# Patient Record
Sex: FEMALE | Race: WHITE | Employment: STUDENT | URBAN - NONMETROPOLITAN AREA
[De-identification: names, ages, dates, MRNs, and addresses within clinical notes are randomized per-mention and may not be internally consistent; named-entity substitution may affect disease eponyms.]

---

## 2017-12-03 ENCOUNTER — HOSPITAL ENCOUNTER (EMERGENCY)
Age: 22
Discharge: HOME | End: 2017-12-03
Payer: COMMERCIAL

## 2017-12-03 ENCOUNTER — APPOINTMENT (OUTPATIENT)
Dept: GENERAL RADIOLOGY | Age: 22
End: 2017-12-03
Payer: COMMERCIAL

## 2017-12-03 VITALS
TEMPERATURE: 98.5 F | HEART RATE: 65 BPM | DIASTOLIC BLOOD PRESSURE: 72 MMHG | OXYGEN SATURATION: 99 % | SYSTOLIC BLOOD PRESSURE: 117 MMHG | RESPIRATION RATE: 16 BRPM

## 2017-12-03 DIAGNOSIS — R42 LIGHTHEADEDNESS: Primary | ICD-10-CM

## 2017-12-03 LAB
-: ABNORMAL
ABSOLUTE EOS #: 0.2 K/UL (ref 0–0.4)
ABSOLUTE IMMATURE GRANULOCYTE: NORMAL K/UL (ref 0–0.3)
ABSOLUTE LYMPH #: 3.1 K/UL (ref 1–4.8)
ABSOLUTE MONO #: 0.9 K/UL (ref 0–1)
AMORPHOUS: ABNORMAL
ANION GAP SERPL CALCULATED.3IONS-SCNC: 16 MMOL/L
BACTERIA: ABNORMAL
BASOPHILS # BLD: 1 % (ref 0–2)
BASOPHILS ABSOLUTE: 0.1 K/UL (ref 0–0.2)
BILIRUBIN URINE: NEGATIVE
BNP INTERPRETATION: NORMAL
BUN BLDV-MCNC: 11 MG/DL (ref 6–20)
BUN/CREAT BLD: 14 (ref 9–20)
CALCIUM SERPL-MCNC: 9.3 MG/DL (ref 8.6–10.4)
CASTS UA: ABNORMAL /LPF
CHLORIDE BLD-SCNC: 103 MMOL/L (ref 98–107)
CO2: 22 MMOL/L (ref 20–31)
COLOR: YELLOW
COMMENT UA: ABNORMAL
CREAT SERPL-MCNC: 0.76 MG/DL (ref 0.5–0.9)
CRYSTALS, UA: ABNORMAL /HPF
DIFFERENTIAL TYPE: NORMAL
EKG ATRIAL RATE: 66 BPM
EKG P AXIS: 12 DEGREES
EKG P-R INTERVAL: 156 MS
EKG Q-T INTERVAL: 366 MS
EKG QRS DURATION: 92 MS
EKG QTC CALCULATION (BAZETT): 383 MS
EKG R AXIS: 32 DEGREES
EKG T AXIS: 12 DEGREES
EKG VENTRICULAR RATE: 66 BPM
EOSINOPHILS RELATIVE PERCENT: 3 % (ref 0–8)
EPITHELIAL CELLS UA: ABNORMAL /HPF (ref 0–25)
GFR AFRICAN AMERICAN: >60 ML/MIN
GFR NON-AFRICAN AMERICAN: >60 ML/MIN
GFR SERPL CREATININE-BSD FRML MDRD: NORMAL ML/MIN/{1.73_M2}
GFR SERPL CREATININE-BSD FRML MDRD: NORMAL ML/MIN/{1.73_M2}
GLUCOSE BLD-MCNC: 88 MG/DL (ref 70–99)
GLUCOSE URINE: NEGATIVE
HCG(URINE) PREGNANCY TEST: NEGATIVE
HCT VFR BLD CALC: 40.8 % (ref 36–46)
HEMOGLOBIN: 13.3 G/DL (ref 12–16)
IMMATURE GRANULOCYTES: NORMAL %
INR BLD: NORMAL (ref 0.9–1.2)
KETONES, URINE: NEGATIVE
LEUKOCYTE ESTERASE, URINE: NEGATIVE
LYMPHOCYTES # BLD: 34 % (ref 25–45)
MCH RBC QN AUTO: 29.4 PG (ref 26–34)
MCHC RBC AUTO-ENTMCNC: 32.7 G/DL (ref 31–37)
MCV RBC AUTO: 90 FL (ref 80–100)
MONOCYTES # BLD: 10 % (ref 0–12)
MUCUS: ABNORMAL
NITRITE, URINE: NEGATIVE
OTHER OBSERVATIONS UA: ABNORMAL
PARTIAL THROMBOPLASTIN TIME: 25 SEC (ref 23.2–34.4)
PDW BLD-RTO: 13.2 % (ref 12.1–15.2)
PH UA: 8 (ref 5–9)
PLATELET # BLD: 300 K/UL (ref 140–450)
PLATELET ESTIMATE: NORMAL
PMV BLD AUTO: 8.6 FL (ref 6–12)
POTASSIUM SERPL-SCNC: 4.1 MMOL/L (ref 3.7–5.3)
PRO-BNP: NORMAL PG/ML
PROTEIN UA: NEGATIVE
PROTHROMBIN TIME: NORMAL SEC (ref 9.7–12.2)
RBC # BLD: 4.54 M/UL (ref 4–5.2)
RBC # BLD: NORMAL 10*6/UL
RBC UA: ABNORMAL /HPF (ref 0–2)
RENAL EPITHELIAL, UA: ABNORMAL /HPF
SEG NEUTROPHILS: 52 % (ref 34–64)
SEGMENTED NEUTROPHILS ABSOLUTE COUNT: 4.9 K/UL (ref 1.8–7.7)
SODIUM BLD-SCNC: 141 MMOL/L (ref 135–144)
SPECIFIC GRAVITY UA: 1.02 (ref 1.01–1.02)
TRICHOMONAS: ABNORMAL
TROPONIN INTERP: NORMAL
TROPONIN T: <0.03 NG/ML
TSH SERPL DL<=0.05 MIU/L-ACNC: 2.52 MIU/L (ref 0.3–5)
TURBIDITY: ABNORMAL
URINE HGB: NEGATIVE
UROBILINOGEN, URINE: NORMAL
WBC # BLD: 9.2 K/UL (ref 4.5–13.5)
WBC # BLD: NORMAL 10*3/UL
WBC UA: ABNORMAL /HPF (ref 0–5)
YEAST: ABNORMAL

## 2017-12-03 PROCEDURE — 85025 COMPLETE CBC W/AUTO DIFF WBC: CPT

## 2017-12-03 PROCEDURE — 85730 THROMBOPLASTIN TIME PARTIAL: CPT

## 2017-12-03 PROCEDURE — 71010 XR CHEST PORTABLE: CPT

## 2017-12-03 PROCEDURE — 99284 EMERGENCY DEPT VISIT MOD MDM: CPT

## 2017-12-03 PROCEDURE — 81001 URINALYSIS AUTO W/SCOPE: CPT

## 2017-12-03 PROCEDURE — 80048 BASIC METABOLIC PNL TOTAL CA: CPT

## 2017-12-03 PROCEDURE — 83880 ASSAY OF NATRIURETIC PEPTIDE: CPT

## 2017-12-03 PROCEDURE — 6370000000 HC RX 637 (ALT 250 FOR IP): Performed by: PHYSICIAN ASSISTANT

## 2017-12-03 PROCEDURE — 85610 PROTHROMBIN TIME: CPT

## 2017-12-03 PROCEDURE — 84443 ASSAY THYROID STIM HORMONE: CPT

## 2017-12-03 PROCEDURE — 84703 CHORIONIC GONADOTROPIN ASSAY: CPT

## 2017-12-03 PROCEDURE — 84484 ASSAY OF TROPONIN QUANT: CPT

## 2017-12-03 PROCEDURE — 93005 ELECTROCARDIOGRAM TRACING: CPT

## 2017-12-03 RX ORDER — MECLIZINE HCL 12.5 MG/1
12.5 TABLET ORAL ONCE
Status: COMPLETED | OUTPATIENT
Start: 2017-12-03 | End: 2017-12-03

## 2017-12-03 RX ORDER — NORGESTIMATE AND ETHINYL ESTRADIOL 0.25-0.035
1 KIT ORAL DAILY
COMMUNITY

## 2017-12-03 RX ADMIN — MECLIZINE 12.5 MG: 12.5 TABLET ORAL at 17:58

## 2017-12-03 NOTE — LETTER
[facility] ED  [address] [place] [address]  Phone: [phone]  Fax: [phone]             December 3, 2017    Patient: Mary Farias   YOB: 1995   Date of Visit: 12/3/2017       To Whom It May Concern:    Mary Farias was seen and treated in our emergency department on 12/3/2017. She may return to school on 12/05/2017.       Sincerely,             Signature:__________________________________

## 2017-12-04 ASSESSMENT — ENCOUNTER SYMPTOMS
CONSTIPATION: 0
CHEST TIGHTNESS: 0
BLOOD IN STOOL: 0
ABDOMINAL PAIN: 0
EYE DISCHARGE: 0
SHORTNESS OF BREATH: 0
DIARRHEA: 0
NAUSEA: 0
VOMITING: 0
EYE REDNESS: 0
RHINORRHEA: 0
COUGH: 0
SORE THROAT: 0
BACK PAIN: 0
WHEEZING: 0

## 2017-12-04 NOTE — ED PROVIDER NOTES
[facility] ED  eMERGENCY dEPARTMENT eNCOUnter      Pt Name: Dionte Guan  MRN: [id number]  [place] 1995  Date of evaluation: 12/3/2017  Provider: Viola Singh Dr       Chief Complaint   Patient presents with    Dizziness     x3 days         HISTORY OF PRESENT ILLNESS   (Location/Symptom, Timing/Onset, Context/Setting, Quality, Duration, Modifying Factors, Severity)  Note limiting factors. Dionte Guan is a 25 y.o. female who presents to the emergency department  With complaints of intermittent dizziness and lightheadedness over the past 2 days. Patient reports that she just feels like she is off. Reports she's been very stressed with school work recently and is unsure if this is causing it. States she's had some congestion to take some over-the-counter medication but did not relieve her symptoms. She denies any unilateral weakness or paresthesias denies any headache or neck pain. She denies any fever or chills. Reports she is otherwise healthy she is up-to-date on immunizations. Denies any chest pain or shortness of breath. She reports she just unsure what is going on so she decided to come to the ER. She reports sometimes the symptoms are like room spinning but other times she just feels off. She has no other complaints at this time. Denies any new medication other than the over-the-counter cough medication denies any alcohol or drug use. HPI    Nursing Notes were reviewed. REVIEW OF SYSTEMS    (2-9 systems for level 4, 10 or more for level 5)     Review of Systems   Constitutional: Negative for chills, diaphoresis and fever. HENT: Negative for congestion, ear pain, rhinorrhea and sore throat. Eyes: Negative for discharge, redness and visual disturbance. Respiratory: Negative for cough, chest tightness, shortness of breath and wheezing. Cardiovascular: Negative for chest pain and palpitations.    Gastrointestinal: Negative for abdominal pain, blood in stool, constipation, diarrhea, nausea and vomiting. Endocrine: Negative for polydipsia, polyphagia and polyuria. Genitourinary: Negative for decreased urine volume, difficulty urinating, dysuria, frequency and hematuria. Musculoskeletal: Negative for arthralgias, back pain and myalgias. Skin: Negative for pallor and rash. Allergic/Immunologic: Negative for food allergies and immunocompromised state. Neurological: Positive for dizziness and light-headedness. Negative for syncope and weakness. Hematological: Negative for adenopathy. Does not bruise/bleed easily. Psychiatric/Behavioral: Negative for behavioral problems and suicidal ideas. The patient is not nervous/anxious. Except as noted above the remainder of the review of systems was reviewed and negative. PAST MEDICAL HISTORY   History reviewed. No pertinent past medical history. SURGICAL HISTORY     History reviewed. No pertinent surgical history. CURRENT MEDICATIONS       Previous Medications    NORGESTIMATE-ETHINYL ESTRADIOL (SPRINTEC 28) 0.25-35 MG-MCG PER TABLET    Take 1 tablet by mouth daily       ALLERGIES     Augmentin [amoxicillin-pot clavulanate] and Pcn [penicillins]    FAMILY HISTORY     History reviewed. No pertinent family history.        SOCIAL HISTORY       Social History     Social History    Marital status: Single     Spouse name: N/A    Number of children: N/A    Years of education: N/A     Social History Main Topics    Smoking status: Never Smoker    Smokeless tobacco: Never Used    Alcohol use None    Drug use: No    Sexual activity: Not Asked     Other Topics Concern    None     Social History Narrative    None       SCREENINGS    [place] Coma Scale  Eye Opening: Spontaneous  Best Verbal Response: Oriented  Best Motor Response: Obeys commands  [place] Coma Scale Score: 15        PHYSICAL EXAM    (up to 7 for level 4, 8 or more for level 5)     ED Triage Vitals [12/03/17 1731]   BP Temp Temp chart in the absence of a cardiologist.      RADIOLOGY:   Non-plain film images such as CT, Ultrasound and MRI are read by the radiologist. Plain radiographic images are visualized and preliminarily interpreted by the emergency physician with the below findings:      Interpretation per the Radiologist below, if available at the time of this note:    XR Chest Portable   Final Result   Impression:     No acute cardiopulmonary process. Electronically Signed By: Lolita Byrd   on  12/03/2017  18:35            ED BEDSIDE ULTRASOUND:   Performed by ED Physician - none    LABS:  Labs Reviewed   UA W/REFLEX CULTURE - Abnormal; Notable for the following:        Result Value    Turbidity UA SLIGHTLY CLOUDY (*)     All other components within normal limits   MICROSCOPIC URINALYSIS - Abnormal; Notable for the following:     Bacteria, UA TRACE (*)     Amorphous, UA 1+ (*)     All other components within normal limits   APTT   BASIC METABOLIC PANEL   BRAIN NATRIURETIC PEPTIDE   CBC WITH AUTO DIFFERENTIAL   PROTIME-INR   TROPONIN   TSH WITHOUT REFLEX   PREGNANCY, URINE       All other labs were within normal range or not returned as of this dictation. EMERGENCY DEPARTMENT COURSE and DIFFERENTIAL DIAGNOSIS/MDM:   Vitals:    Vitals:    12/03/17 1731 12/03/17 1801   BP: (!) 70/48    Pulse: 68    Resp: 16    Temp: 100 °F (37.8 °C) 98.5 °F (36.9 °C)   TempSrc: Tympanic Oral   SpO2: 97%          MDM  Otherwise healthy 71-year-old female who presents with intermittent dizziness lightheadedness for the past 2 days. On exam, she feels well. She did have an initial blood pressure which was reading 77 systolic however this was only once she has had consistent readings of normal blood pressure since then. I did have the nurse take orthostatics which did show a slight elevation in the heart rate upon standing. I had a recent on my exam in her pulse went from upper 70s to the low 80s. She felt well.   At this point she was placed on protocol orders which did include a BNP and a PT/INR I did have the lab try to discontinue these as I did not want these ordered. At this point we'll rule out acute arrhythmia, pneumonia, infection dehydration or O imbalance. This could be related to her increased stress due to school work as well. We'll give some meclizine and reassess    Patient states she is feeling well she has not had any episodes. She has been a real Marie here in the ER with ease. I discussed with her at this point labs are unremarkable. I told her that with her symptoms to be complete I did want to recommend that she follows up with cardiology. I did discuss her workup 23 physician who agrees with cardiology follow-up and discharged home. EKG shows normal sinus rhythm without any ST or T-wave changes. FL interval normal QT interval normal.    Patient continues to be resting currently and in no distress. Blood pressure has stayed stable. She is not tachycardic. She has never had any chest pain or shortness of breath. I do think that she needs to follow up with cardiology. She was given strict and specific return warnings. She is given a school note understands to relax tomorrow. She verbalizes understanding this plan questions have been answered at length. She did show that she is any new or worsening symptoms to return immediately to the ER. Procedures    FINAL IMPRESSION      1.  Lightheadedness          DISPOSITION/PLAN   DISPOSITION Decision to Discharge    PATIENT REFERRED TO:  [facility] ED  [address]  [phone]    If symptoms worsen, As needed    Bryan Mendiola MD  [facility]  [phone]    Schedule an appointment as soon as possible for a visit   Call this week to arrange cardiology follow up      DISCHARGE MEDICATIONS:  New Prescriptions    No medications on file              Summation      Patient Course:      ED Medications

## 2023-07-24 ENCOUNTER — LAB (OUTPATIENT)
Dept: LAB | Facility: LAB | Age: 28
End: 2023-07-24
Payer: COMMERCIAL

## 2023-07-24 ENCOUNTER — OFFICE VISIT (OUTPATIENT)
Dept: PRIMARY CARE | Facility: CLINIC | Age: 28
End: 2023-07-24
Payer: COMMERCIAL

## 2023-07-24 VITALS
SYSTOLIC BLOOD PRESSURE: 108 MMHG | HEIGHT: 60 IN | DIASTOLIC BLOOD PRESSURE: 70 MMHG | WEIGHT: 220 LBS | BODY MASS INDEX: 43.19 KG/M2 | HEART RATE: 62 BPM

## 2023-07-24 DIAGNOSIS — R73.9 HYPERGLYCEMIA: ICD-10-CM

## 2023-07-24 DIAGNOSIS — H93.8X3 IRRITATION OF EAR, BILATERAL: ICD-10-CM

## 2023-07-24 DIAGNOSIS — Z13.220 LIPID SCREENING: ICD-10-CM

## 2023-07-24 DIAGNOSIS — Z13.21 ENCOUNTER FOR VITAMIN DEFICIENCY SCREENING: ICD-10-CM

## 2023-07-24 DIAGNOSIS — Z13.29 SCREENING FOR THYROID DISORDER: ICD-10-CM

## 2023-07-24 DIAGNOSIS — F32.A ANXIETY AND DEPRESSION: Primary | ICD-10-CM

## 2023-07-24 DIAGNOSIS — F41.9 ANXIETY AND DEPRESSION: Primary | ICD-10-CM

## 2023-07-24 DIAGNOSIS — Z76.89 ENCOUNTER TO ESTABLISH CARE WITH NEW DOCTOR: ICD-10-CM

## 2023-07-24 DIAGNOSIS — F31.9 BIPOLAR 1 DISORDER (MULTI): ICD-10-CM

## 2023-07-24 PROBLEM — K29.70 GASTRITIS: Status: ACTIVE | Noted: 2020-02-17

## 2023-07-24 PROBLEM — R63.5 EXCESSIVE WEIGHT GAIN: Status: ACTIVE | Noted: 2023-07-24

## 2023-07-24 LAB
ALANINE AMINOTRANSFERASE (SGPT) (U/L) IN SER/PLAS: 130 U/L (ref 7–45)
ALBUMIN (G/DL) IN SER/PLAS: 4.4 G/DL (ref 3.4–5)
ALKALINE PHOSPHATASE (U/L) IN SER/PLAS: 40 U/L (ref 33–110)
ANION GAP IN SER/PLAS: 13 MMOL/L (ref 10–20)
ASPARTATE AMINOTRANSFERASE (SGOT) (U/L) IN SER/PLAS: 63 U/L (ref 9–39)
BILIRUBIN TOTAL (MG/DL) IN SER/PLAS: 0.5 MG/DL (ref 0–1.2)
CALCIDIOL (25 OH VITAMIN D3) (NG/ML) IN SER/PLAS: 21 NG/ML
CALCIUM (MG/DL) IN SER/PLAS: 9.5 MG/DL (ref 8.6–10.3)
CARBON DIOXIDE, TOTAL (MMOL/L) IN SER/PLAS: 28 MMOL/L (ref 21–32)
CHLORIDE (MMOL/L) IN SER/PLAS: 103 MMOL/L (ref 98–107)
CHOLESTEROL (MG/DL) IN SER/PLAS: 156 MG/DL (ref 0–199)
CHOLESTEROL IN HDL (MG/DL) IN SER/PLAS: 32.5 MG/DL
CHOLESTEROL/HDL RATIO: 4.8
CREATININE (MG/DL) IN SER/PLAS: 0.9 MG/DL (ref 0.5–1.05)
ERYTHROCYTE DISTRIBUTION WIDTH (RATIO) BY AUTOMATED COUNT: 12 % (ref 11.5–14.5)
ERYTHROCYTE MEAN CORPUSCULAR HEMOGLOBIN CONCENTRATION (G/DL) BY AUTOMATED: 32.9 G/DL (ref 32–36)
ERYTHROCYTE MEAN CORPUSCULAR VOLUME (FL) BY AUTOMATED COUNT: 92 FL (ref 80–100)
ERYTHROCYTES (10*6/UL) IN BLOOD BY AUTOMATED COUNT: 4.62 X10E12/L (ref 4–5.2)
ESTIMATED AVERAGE GLUCOSE FOR HBA1C: 117 MG/DL
GFR FEMALE: 90 ML/MIN/1.73M2
GLUCOSE (MG/DL) IN SER/PLAS: 77 MG/DL (ref 74–99)
HEMATOCRIT (%) IN BLOOD BY AUTOMATED COUNT: 42.5 % (ref 36–46)
HEMOGLOBIN (G/DL) IN BLOOD: 14 G/DL (ref 12–16)
HEMOGLOBIN A1C/HEMOGLOBIN TOTAL IN BLOOD: 5.7 %
LDL: 77 MG/DL (ref 0–99)
LEUKOCYTES (10*3/UL) IN BLOOD BY AUTOMATED COUNT: 9 X10E9/L (ref 4.4–11.3)
NON HDL CHOLESTEROL: 124 MG/DL
PLATELETS (10*3/UL) IN BLOOD AUTOMATED COUNT: 326 X10E9/L (ref 150–450)
POTASSIUM (MMOL/L) IN SER/PLAS: 4.7 MMOL/L (ref 3.5–5.3)
PROTEIN TOTAL: 6.9 G/DL (ref 6.4–8.2)
SODIUM (MMOL/L) IN SER/PLAS: 139 MMOL/L (ref 136–145)
THYROTROPIN (MIU/L) IN SER/PLAS BY DETECTION LIMIT <= 0.05 MIU/L: 3.26 MIU/L (ref 0.44–3.98)
TRIGLYCERIDE (MG/DL) IN SER/PLAS: 233 MG/DL (ref 0–149)
UREA NITROGEN (MG/DL) IN SER/PLAS: 12 MG/DL (ref 6–23)
VLDL: 47 MG/DL (ref 0–40)

## 2023-07-24 PROCEDURE — 82306 VITAMIN D 25 HYDROXY: CPT

## 2023-07-24 PROCEDURE — 99214 OFFICE O/P EST MOD 30 MIN: CPT | Performed by: STUDENT IN AN ORGANIZED HEALTH CARE EDUCATION/TRAINING PROGRAM

## 2023-07-24 PROCEDURE — 83036 HEMOGLOBIN GLYCOSYLATED A1C: CPT

## 2023-07-24 PROCEDURE — 80061 LIPID PANEL: CPT

## 2023-07-24 PROCEDURE — 80053 COMPREHEN METABOLIC PANEL: CPT

## 2023-07-24 PROCEDURE — 85027 COMPLETE CBC AUTOMATED: CPT

## 2023-07-24 PROCEDURE — 36415 COLL VENOUS BLD VENIPUNCTURE: CPT

## 2023-07-24 PROCEDURE — 93000 ELECTROCARDIOGRAM COMPLETE: CPT | Performed by: STUDENT IN AN ORGANIZED HEALTH CARE EDUCATION/TRAINING PROGRAM

## 2023-07-24 PROCEDURE — 84443 ASSAY THYROID STIM HORMONE: CPT

## 2023-07-24 PROCEDURE — 1036F TOBACCO NON-USER: CPT | Performed by: STUDENT IN AN ORGANIZED HEALTH CARE EDUCATION/TRAINING PROGRAM

## 2023-07-24 RX ORDER — FLUOXETINE HYDROCHLORIDE 40 MG/1
40 CAPSULE ORAL DAILY
Qty: 90 CAPSULE | Refills: 0 | Status: SHIPPED | OUTPATIENT
Start: 2023-07-24 | End: 2023-10-05 | Stop reason: SDUPTHER

## 2023-07-24 RX ORDER — FLUOXETINE HYDROCHLORIDE 40 MG/1
40 CAPSULE ORAL DAILY
COMMUNITY
End: 2023-07-24 | Stop reason: SDUPTHER

## 2023-07-24 RX ORDER — FLUOXETINE 10 MG/1
10 CAPSULE ORAL DAILY
COMMUNITY
End: 2023-07-24 | Stop reason: SDUPTHER

## 2023-07-24 RX ORDER — FLUOXETINE 10 MG/1
10 CAPSULE ORAL DAILY
Qty: 90 CAPSULE | Refills: 0 | Status: SHIPPED | OUTPATIENT
Start: 2023-07-24 | End: 2023-10-05 | Stop reason: SDUPTHER

## 2023-07-24 RX ORDER — NEOMYCIN SULFATE, POLYMYXIN B SULFATE, HYDROCORTISONE 3.5; 10000; 1 MG/ML; [USP'U]/ML; MG/ML
3 SOLUTION/ DROPS AURICULAR (OTIC) 4 TIMES DAILY
Qty: 6 ML | Refills: 0 | Status: SHIPPED | OUTPATIENT
Start: 2023-07-24 | End: 2023-08-03

## 2023-07-24 ASSESSMENT — ENCOUNTER SYMPTOMS
CHILLS: 0
NERVOUS/ANXIOUS: 1
BLOOD IN STOOL: 0
DYSPHORIC MOOD: 1
FEVER: 0
HEMATURIA: 0

## 2023-07-24 ASSESSMENT — PATIENT HEALTH QUESTIONNAIRE - PHQ9
2. FEELING DOWN, DEPRESSED OR HOPELESS: SEVERAL DAYS
SUM OF ALL RESPONSES TO PHQ9 QUESTIONS 1 AND 2: 1
1. LITTLE INTEREST OR PLEASURE IN DOING THINGS: NOT AT ALL
10. IF YOU CHECKED OFF ANY PROBLEMS, HOW DIFFICULT HAVE THESE PROBLEMS MADE IT FOR YOU TO DO YOUR WORK, TAKE CARE OF THINGS AT HOME, OR GET ALONG WITH OTHER PEOPLE: NOT DIFFICULT AT ALL

## 2023-07-24 ASSESSMENT — ANXIETY QUESTIONNAIRES
3. WORRYING TOO MUCH ABOUT DIFFERENT THINGS: NOT AT ALL
4. TROUBLE RELAXING: NOT AT ALL
7. FEELING AFRAID AS IF SOMETHING AWFUL MIGHT HAPPEN: NOT AT ALL
2. NOT BEING ABLE TO STOP OR CONTROL WORRYING: NOT AT ALL
IF YOU CHECKED OFF ANY PROBLEMS ON THIS QUESTIONNAIRE, HOW DIFFICULT HAVE THESE PROBLEMS MADE IT FOR YOU TO DO YOUR WORK, TAKE CARE OF THINGS AT HOME, OR GET ALONG WITH OTHER PEOPLE: NOT DIFFICULT AT ALL
6. BECOMING EASILY ANNOYED OR IRRITABLE: NOT AT ALL
1. FEELING NERVOUS, ANXIOUS, OR ON EDGE: NOT AT ALL
GAD7 TOTAL SCORE: 0
5. BEING SO RESTLESS THAT IT IS HARD TO SIT STILL: NOT AT ALL

## 2023-07-24 NOTE — PATIENT INSTRUCTIONS
It was nice meeting you today.     Have blood work done.  Please have blood work drawn today.    We will refer you to a psychiatrist     Exercise helps improve your health: I encourage you to slowly increase your activity level 10 - 30 min as tolerated 3-5 times per week.     Diet: You can improve your health with low carbohydrate, low-fat and high-fiber diet.     DASH diet can help improve your blood pressure and overall health.    You can sign up for [employer] to view your result as well     If you need anything or have any questions, please call the clinic at [phone]     Follow up as scheduled or sooner if needed

## 2023-07-24 NOTE — PROGRESS NOTES
Subjective   Patient ID: Saskia Winn is a 27 y.o. female who presents for Follow-up.    HPI   Presents for a visit.  She establish care with me on 01/30 0/2023.  She has a history of bipolar, anxiety and depression and was seeing a psychiatrist.  Today she reports that since she turned 27, her psychiatrist is refusing to prescribe fluoxetine due to insurance coverage.  She is requesting a refill    During last visit, she was referred to a psychiatrist, she has not yet established with stating that she has been busy past 6 months.    She reports having recurrent sickness of the same problem.  Has been having recurrent cold.    Additionally she reports a history of gastritis that tends to flareup due to cyst stress.  This is currently controlled with over-the-counter Pepcid.    She reports right-sided ear pressure and decreased hearing after she went on a vacation and was engaged in swimming activity. She denies fever and has no chills or chest pain      Medications and allergy list: Reviewed    Vitals:  Reviewed    Review of Systems   Constitutional:  Negative for chills and fever.   HENT:  Positive for hearing loss. Negative for ear discharge and ear pain.    Cardiovascular:  Negative for chest pain.   Gastrointestinal:  Negative for blood in stool.   Genitourinary:  Negative for hematuria.   Psychiatric/Behavioral:  Positive for dysphoric mood. Negative for suicidal ideas. The patient is nervous/anxious.        Objective     Physical Exam  Constitutional:       General: She is not in acute distress.     Appearance: Normal appearance. She is obese. She is not ill-appearing.   HENT:      Head: Normocephalic and atraumatic.      Right Ear: External ear normal. There is impacted cerumen.      Left Ear: External ear normal.      Ears:      Comments: There is erythema noted in left ear canal    Right ear canal with erythema and wax  Pulmonary:      Effort: Pulmonary effort is normal.      Breath sounds: Normal breath  sounds.   Musculoskeletal:         General: No swelling or tenderness.      Right lower leg: No edema.      Left lower leg: No edema.   Neurological:      Mental Status: She is alert.         Assessment/Plan     History of bipolar/anxiety and depression.  She is requesting for refill of her fluoxetine which she has taking for several years and is tolerating well.  Denies any side effects.  She has been referred to a psychiatrist.  Recommend she follow-up with psychiatry due to concurrent presence of bipolar and anxiety and depression.  We will obtain EKG today to check her QTc and will have her obtain the blood work that was ordered. Will refill her fluoxetine which she has tolerated without side effect.     Right-sided ear pressure.  On exam there appears to be ear irritation bilaterally along with earwax.  She endorse hearing loss. We will refer her to ENT and also prescribe antibiotic steroid eardrops.      Problem List Items Addressed This Visit       Bipolar 1 disorder (CMS/[facility])    Relevant Orders    Referral to Psychiatry    Follow Up In [facility] - PCP - Established    Irritation of ear, bilateral    Relevant Medications    neomycin-polymyxin-HC (Cortisporin) otic solution    Other Relevant Orders    Referral to ENT    Follow Up In [facility] - PCP - Established    Anxiety and depression - Primary    Relevant Medications    FLUoxetine (PROzac) 40 mg capsule    FLUoxetine (PROzac) 10 mg capsule    Other Relevant Orders    ECG 12 lead (Clinic Performed) (Completed)    Referral to Psychiatry    Referral to Psychiatry    Follow Up In [facility] - PCP - Established     Other Visit Diagnoses       Encounter to establish care with new doctor        Relevant Orders    Comprehensive Metabolic Panel (Completed)    CBC (Completed)    Follow Up In [facility] - PCP - Established    Screening for thyroid disorder        Relevant Orders    TSH with reflex to Free T4 if abnormal  (Completed)    Follow Up In [facility] - PCP - Established    Encounter for vitamin deficiency screening        Relevant Orders    Vitamin D, Total (Completed)    Lipid screening        Relevant Orders    Lipid Panel (Completed)    Hyperglycemia        Relevant Orders    Hemoglobin A1C (Completed)

## 2023-07-24 NOTE — PROGRESS NOTES
Subjective   Patient ID: Saskia Winn is a 27 y.o. female who presents for Follow-up.    HPI     Review of Systems    Objective   /70 (BP Location: Right arm, Patient Position: Sitting)   Pulse 62   Ht 1.524 m (5')   Wt 99.8 kg (220 lb)   BMI 42.97 kg/m²     Physical Exam    Assessment/Plan

## 2023-07-27 ENCOUNTER — TELEPHONE (OUTPATIENT)
Dept: PRIMARY CARE | Facility: CLINIC | Age: 28
End: 2023-07-27
Payer: COMMERCIAL

## 2023-07-27 NOTE — RESULT ENCOUNTER NOTE
Result reviewed. Vitamin d insufficiency. Recommend over the counter vitamin d of 1000 units daily.   Abnormal liver enzyme. Recommend avoiding alcohol and tylenol products. We will repeat in subsequent visit to monitor trend.    Your cholesterol is abnormal. Please continue to follow low fat, low carbohydrate and high fiber diet.  Thanks

## 2023-07-27 NOTE — TELEPHONE ENCOUNTER
----- Message from Carrillo Moe DO sent at 7/26/2023  9:40 PM EDT -----  Result reviewed. Vitamin d insufficiency. Recommend over the counter vitamin d of 1000 units daily.   Abnormal liver enzyme. Recommend avoiding alcohol and tylenol products. We will repeat in subsequent visit to monitor trend.    Your cholesterol is abnormal. Please continue to follow low fat, low carbohydrate and high fiber diet.  Thanks

## 2023-07-28 ENCOUNTER — TELEPHONE (OUTPATIENT)
Dept: PRIMARY CARE | Facility: CLINIC | Age: 28
End: 2023-07-28
Payer: COMMERCIAL

## 2023-07-31 ENCOUNTER — TELEPHONE (OUTPATIENT)
Dept: PRIMARY CARE | Facility: CLINIC | Age: 28
End: 2023-07-31
Payer: COMMERCIAL

## 2023-08-01 ENCOUNTER — TELEPHONE (OUTPATIENT)
Dept: PRIMARY CARE | Facility: CLINIC | Age: 28
End: 2023-08-01
Payer: COMMERCIAL

## 2023-08-01 DIAGNOSIS — R79.89 ELEVATED LFTS: Primary | ICD-10-CM

## 2023-08-01 NOTE — TELEPHONE ENCOUNTER
Saskia called back / given results    She said the abnormal liver enzyme has been going on for a couple years. Should she see a specialist for this ?

## 2023-10-04 ENCOUNTER — OFFICE VISIT (OUTPATIENT)
Dept: OTOLARYNGOLOGY | Facility: CLINIC | Age: 28
End: 2023-10-04
Payer: COMMERCIAL

## 2023-10-04 VITALS — BODY MASS INDEX: 42.97 KG/M2 | WEIGHT: 220 LBS

## 2023-10-04 DIAGNOSIS — K21.9 GASTROESOPHAGEAL REFLUX DISEASE, UNSPECIFIED WHETHER ESOPHAGITIS PRESENT: ICD-10-CM

## 2023-10-04 DIAGNOSIS — J32.9 CHRONIC SINUSITIS, UNSPECIFIED LOCATION: ICD-10-CM

## 2023-10-04 DIAGNOSIS — J34.2 DEVIATED SEPTUM: Primary | ICD-10-CM

## 2023-10-04 PROBLEM — J30.9 CHRONIC ALLERGIC RHINITIS: Status: ACTIVE | Noted: 2023-10-04

## 2023-10-04 PROBLEM — H69.90 EUSTACHIAN TUBE DYSFUNCTION: Status: ACTIVE | Noted: 2023-10-04

## 2023-10-04 PROCEDURE — 1036F TOBACCO NON-USER: CPT | Performed by: GENERAL PRACTICE

## 2023-10-04 PROCEDURE — 99214 OFFICE O/P EST MOD 30 MIN: CPT | Performed by: GENERAL PRACTICE

## 2023-10-04 RX ORDER — ACETAMINOPHEN 500 MG
TABLET ORAL
COMMUNITY
Start: 2023-08-31

## 2023-10-04 RX ORDER — CLARITHROMYCIN 500 MG/1
1000 TABLET, FILM COATED, EXTENDED RELEASE ORAL DAILY
Qty: 28 TABLET | Refills: 0 | Status: SHIPPED | OUTPATIENT
Start: 2023-10-04 | End: 2023-10-18

## 2023-10-04 RX ORDER — OMEPRAZOLE 40 MG/1
1 CAPSULE, DELAYED RELEASE ORAL
COMMUNITY
Start: 2023-08-31 | End: 2023-12-28 | Stop reason: SDUPTHER

## 2023-10-04 NOTE — PROGRESS NOTES
Otolaryngology - Head and Neck Surgery Outpatient New Patient Visit Note    Chief Concern:  sinusitis    History Of Present Illness  Saskia Winn is a 27 y.o. female with a h     27yoF presents for return of rhinosinusitis, sore throat and cough.  Reports persitent left maxillary sinus fullness since last being seen, and >1 week of flare of sinus pain/pressure, discolored rhinorrhea/PND contributing to sore throat and productive cough.      Reports improvement in reflux with PPI/H2 blockers prescribed previously.      No ongoing otalgia or hearing loss, no audiogram completed.        Recall  27yoF presents for evaluation of recent ear discomfort and recurrent sore throats        reports bothersome ear fullness since water exposure in july. also noted to have otalgia with flight around this time. no ongoing otalgia, but notes fullness, mild hearing loss and some itching.   deneis signficiant priro history of recurrent otitis or ear trauma/surgery.    reports a history of recurrent sore throats associated with URI/sinus and bronchitis symptoms in the last year. reports 4 episodes. no significant prior history.   reports some chronic max/midface fullness/pressure but denies significant priro recurrent sinusitis.   reports a history of seasonal allergic rhinitis but no recent symptoms and no obvious association with recent URI/pharyngitis episodes.       reports a history of GERD, not treated.     Past Medical History  She has a past medical history of Gastritis.    Surgical History  She has no past surgical history on file.     Social History  She reports that she has never smoked. She has never used smokeless tobacco. She reports current alcohol use. She reports current drug use. Drug: Marijuana.    Family History  No family history on file.     Allergies  Augmentin [amoxicillin-pot clavulanate] and Penicillins    Review of Systems  ROS: Pertinent positives as noted in HPI.    - CONSTITUTIONAL: Does not report weight  loss, fever or chills.    - HEENT:   Ear: Does not report tinnitus, vertigo, hearing loss, otalgia, otorrhea  Nose: Does not report  ,  , epistaxis, decreased smell  Throat: Does not report pain, dysphagia, odynophagia  Larynx: Does not report hoarseness,  difficulty breathing, pain with speaking (odynophonia)  Neck: Does not report new masses, pain, swelling  Face: Does not report    ,  , swelling, numbness, weakness     - RESPIRATORY: Does not report SOB or  .    - CV: Does not report palpitations or chest pain.     - GI: Does not report abdominal pain, nausea, vomiting or diarrhea.    - : Does not report dysuria or urinary frequency.    - MSK: Does not report myalgia or joint pain.    - SKIN: Does not report rash or pruritus.    - NEUROLOGICAL: Does not report headache or syncope.    - PSYCHIATRIC: Does not report recent changes in mood. Does not report anxiety or depression.         Physical Exam:     GENERAL:   Alert & Oriented to person, place and time; Normal affect and appearance. Well developed and well nourished. Conversant & cooperative with examination.     HEAD:   Normocephalic, atraumatic. No sinus tenderness to palpation. Normal parotid bilaterally. Normal facial strength.     NEUROLOGIC:   Cranial nerves II-XII grossly intact, gait WNL. Normal mood and affect.    EYES:   Extraocular movements intact. Pupils equal, round, reactive to light and accommodation. No nystagmus, no ptosis. no scleral injection.    EAR:   Normal auricle. No discomfort or TTP with manipulation.   Handheld otoscopic exam showed normal external auditory canals bilaterally. No purulence or EAC inflammation. Minimal cerumen.   Right tympanic membrane clear and mobile without evidence of perforation, retraction or middle ear effusion.   Left tympanic membrane clear and mobile without evidence of perforation, retraction or middle ear effusion.     NOSE:   No external deformity. No external nasal lesions, lacerations, or scars.  Nasal tip symmetrical with normal nasal valves.   Nasal cavity with leftward deviated septum, somewhat dry septal mucosa, otherwise edematous mucosa and turbinates. No lesions, masses, purulence or polyps.     OC/OP:   Mucous membranes moist, no masses, lesions or exudates.   Normal tongue, floor of mouth, teeth, gums, lips.  Cobblestoning and mucoid debris at  posterior pharyngeal wall.    Normal tonsils without erythema, exudate or obvious calculi     NECK:   No neck masses or thyroid enlargement. Trachea midline. No tenderness to palpation    LYMPHATIC:   No cervical lymphadenopathy.     RESPIRATORY:   Symmetric chest elevation & no retractions. No significant hoarseness. No increased work of breathing.    CV:   No clubbing or cyanosis. No obvious edema    Skin:   No facial rashes, vesicles or lesions.     Extremities:   No gross abnormalities      Clinic Procedure        Information review:  External sources (notes, imaging, lab results) listed below personally reviewed to aid in medical decision making process.  -  -  -      Assessment/Plan   Problem List Items Addressed This Visit             ICD-10-CM       ENT    Chronic sinusitis J32.9    Relevant Orders    CT sinus wo IV contrast    Deviated septum - Primary J34.2       Gastrointestinal and Abdominal    Gastroesophageal reflux disease K21.9     Symptoms of ongoing sinusitis with recent flare, will treat with biaxin and acquire CT sinus.  RTC to review.     Consider future septoturbinoplasty and or sinus treatment after CT    Continue reflux management, plan for taper in favor of dietary/behavioral controls.         Follow up:  -plan for follow up in clinic after CT      All of the above findings, impressions, treatment planning and follow up plans were discussed with the patient who indicated understanding.  the patient was instructed to contact or return to clinic sooner if symptoms/signs persist or worsen despite the above management.      Cirilo Bertrand  MD  Otolaryngology - Head and Neck Surgery

## 2023-10-05 ENCOUNTER — OFFICE VISIT (OUTPATIENT)
Dept: PRIMARY CARE | Facility: CLINIC | Age: 28
End: 2023-10-05
Payer: COMMERCIAL

## 2023-10-05 VITALS
SYSTOLIC BLOOD PRESSURE: 120 MMHG | BODY MASS INDEX: 40.48 KG/M2 | WEIGHT: 220 LBS | OXYGEN SATURATION: 97 % | HEART RATE: 82 BPM | DIASTOLIC BLOOD PRESSURE: 80 MMHG | HEIGHT: 62 IN

## 2023-10-05 DIAGNOSIS — R05.8 OTHER COUGH: ICD-10-CM

## 2023-10-05 DIAGNOSIS — F31.9 BIPOLAR 1 DISORDER (MULTI): ICD-10-CM

## 2023-10-05 DIAGNOSIS — F32.A ANXIETY AND DEPRESSION: ICD-10-CM

## 2023-10-05 DIAGNOSIS — E66.01 MORBID OBESITY (MULTI): ICD-10-CM

## 2023-10-05 DIAGNOSIS — F41.9 ANXIETY AND DEPRESSION: ICD-10-CM

## 2023-10-05 DIAGNOSIS — J32.8 OTHER CHRONIC SINUSITIS: Primary | ICD-10-CM

## 2023-10-05 PROCEDURE — 99214 OFFICE O/P EST MOD 30 MIN: CPT | Performed by: STUDENT IN AN ORGANIZED HEALTH CARE EDUCATION/TRAINING PROGRAM

## 2023-10-05 PROCEDURE — 1036F TOBACCO NON-USER: CPT | Performed by: STUDENT IN AN ORGANIZED HEALTH CARE EDUCATION/TRAINING PROGRAM

## 2023-10-05 RX ORDER — FLUOXETINE 10 MG/1
10 CAPSULE ORAL DAILY
Qty: 30 CAPSULE | Refills: 2 | Status: SHIPPED | OUTPATIENT
Start: 2023-10-05 | End: 2024-01-25 | Stop reason: SDUPTHER

## 2023-10-05 RX ORDER — BENZONATATE 200 MG/1
200 CAPSULE ORAL 3 TIMES DAILY PRN
Qty: 42 CAPSULE | Refills: 0 | Status: SHIPPED | OUTPATIENT
Start: 2023-10-05 | End: 2023-11-06 | Stop reason: ALTCHOICE

## 2023-10-05 RX ORDER — FLUOXETINE HYDROCHLORIDE 40 MG/1
40 CAPSULE ORAL DAILY
Qty: 30 CAPSULE | Refills: 0 | Status: SHIPPED | OUTPATIENT
Start: 2023-10-05 | End: 2024-01-25 | Stop reason: SDUPTHER

## 2023-10-05 ASSESSMENT — ENCOUNTER SYMPTOMS
CONSTIPATION: 0
WOUND: 0
EYE DISCHARGE: 0
WHEEZING: 0
CONFUSION: 0
ACTIVITY CHANGE: 0
SLEEP DISTURBANCE: 0
FEVER: 0
NERVOUS/ANXIOUS: 0
BLOOD IN STOOL: 0
POLYPHAGIA: 0
DIZZINESS: 0
TROUBLE SWALLOWING: 0
HEADACHES: 0
SINUS PAIN: 0
SHORTNESS OF BREATH: 0
HEMATURIA: 0
NAUSEA: 0
ABDOMINAL PAIN: 0
WEAKNESS: 0
FATIGUE: 0
COUGH: 1
CHILLS: 0
ABDOMINAL DISTENTION: 0
POLYDIPSIA: 0

## 2023-10-05 ASSESSMENT — ANXIETY QUESTIONNAIRES
4. TROUBLE RELAXING: NOT AT ALL
6. BECOMING EASILY ANNOYED OR IRRITABLE: NOT AT ALL
5. BEING SO RESTLESS THAT IT IS HARD TO SIT STILL: SEVERAL DAYS
GAD7 TOTAL SCORE: 4
1. FEELING NERVOUS, ANXIOUS, OR ON EDGE: SEVERAL DAYS
3. WORRYING TOO MUCH ABOUT DIFFERENT THINGS: SEVERAL DAYS
7. FEELING AFRAID AS IF SOMETHING AWFUL MIGHT HAPPEN: NOT AT ALL
IF YOU CHECKED OFF ANY PROBLEMS ON THIS QUESTIONNAIRE, HOW DIFFICULT HAVE THESE PROBLEMS MADE IT FOR YOU TO DO YOUR WORK, TAKE CARE OF THINGS AT HOME, OR GET ALONG WITH OTHER PEOPLE: NOT DIFFICULT AT ALL
2. NOT BEING ABLE TO STOP OR CONTROL WORRYING: SEVERAL DAYS

## 2023-10-05 ASSESSMENT — PATIENT HEALTH QUESTIONNAIRE - PHQ9
1. LITTLE INTEREST OR PLEASURE IN DOING THINGS: NOT AT ALL
SUM OF ALL RESPONSES TO PHQ9 QUESTIONS 1 AND 2: 0
2. FEELING DOWN, DEPRESSED OR HOPELESS: NOT AT ALL

## 2023-10-05 NOTE — PATIENT INSTRUCTIONS
Obstetric HPI:  Patient is doing well this mornng and without questions or complaints. She denies symptoms of pre-eclampsia -denies vision changes, SOB, RUQ pain. Denies ctx, VB, LOF. Reports good FM.     Objective:     Vital Signs (Most Recent):  Temp: 97.5 °F (36.4 °C) (09/09/17 0011)  Pulse: 81 (09/09/17 0011)  Resp: 16 (09/09/17 0011)  BP: 131/75 (09/09/17 0011)  SpO2: 100 % (09/09/17 0011) Vital Signs (24h Range):  Temp:  [97.3 °F (36.3 °C)-98.5 °F (36.9 °C)] 97.5 °F (36.4 °C)  Pulse:  [68-90] 81  Resp:  [16-20] 16  SpO2:  [75 %-100 %] 100 %  BP: (126-151)/(63-90) 131/75     Weight: 93.4 kg (205 lb 14.6 oz)  Body mass index is 36.48 kg/m².    Intake/Output Summary (Last 24 hours) at 09/09/17 0628  Last data filed at 09/08/17 1900   Gross per 24 hour   Intake             1360 ml   Output              925 ml   Net              435 ml     Significant Labs:  Recent Lab Results     None        FHT: reassuring, 150bpm, mod BTB variability, - accels, small variable x2  TOCO: no contractions      Physical Exam:   Constitutional: She is oriented to person, place, and time. She appears well-developed and well-nourished.    HENT:   Head: Normocephalic and atraumatic.     Neck: Normal range of motion.    Cardiovascular: Regular rhythm and normal heart sounds.     Pulmonary/Chest: Effort normal and breath sounds normal. No respiratory distress. She has no wheezes.        Abdominal: Soft. Bowel sounds are normal. She exhibits no distension. There is no tenderness.   Gravid 27w             Musculoskeletal: Normal range of motion and moves all extremeties.   UCHE/SCDs in place       Neurological: She is alert and oriented to person, place, and time. She has normal reflexes.    Skin: Skin is warm and dry. She is not diaphoretic.    Psychiatric: She has a normal mood and affect.      Please follow up psychiatrist    We are also providing you with list of local mental health areas.     For your cough. We will prescribe tessalon and obtain chest Xray to rule out acute lung problem     Exercise helps improve your health: I encourage you to slowly increase your activity level 10 - 30 min as tolerated 3-5 times per week.      Diet: You can improve your health with low carbohydrate, low-fat and high-fiber diet.      DASH diet can help improve your blood pressure and overall health.    If you need anything or have any questions, please call the clinic at [phone]    If your cough worsens, please go to the ER

## 2023-10-05 NOTE — PROGRESS NOTES
Subjective   Patient ID: Saskia Winn is a 27 y.o. female who presents for Follow-up (Discuss chronic sinus issues) and Cough.    HPI    Medical history of bipolar, anxiety and depression, chronic sinusitis presents to clinic for evaluation of cough and follow-up on her anxiety and depression.  Of note she is saw ENT yesterday and was prescribed clarithromycin to treat chronic sinusitis.    Today she tells me that she has this cough that is ongoing for the past 1 week, she denies coughing up blood, denies shortness of breath or chest pain.  She denies history of smoking cigarettes, but admits to smoking marijuana in the past.  She states her cough is stable and notes getting worse.    Additionally  she is concerned about getting her fluoxetine.  She was normally getting it under the care of her psychiatrist.  During last visit she stated that since starting 27 psychiatrist was not prescribing any more an was searching for a psychiatrist.  At that visit she was referred to a psychiatrist. Today, she stated she is on the waiting list.  She denies suicidal ideation.      Review of Systems   Constitutional:  Negative for activity change, chills, fatigue and fever.   HENT:  Negative for congestion, ear discharge, sinus pain and trouble swallowing.    Eyes:  Negative for discharge and visual disturbance.   Respiratory:  Positive for cough. Negative for shortness of breath and wheezing.    Cardiovascular:  Negative for chest pain and leg swelling.   Gastrointestinal:  Negative for abdominal distention, abdominal pain, blood in stool, constipation and nausea.   Endocrine: Negative for polydipsia and polyphagia.   Genitourinary:  Negative for hematuria.   Musculoskeletal:  Negative for gait problem.   Skin:  Negative for wound.   Allergic/Immunologic: Negative for food allergies.   Neurological:  Negative for dizziness, weakness and headaches.   Psychiatric/Behavioral:  Negative for confusion, sleep disturbance and suicidal  ideas. The patient is not nervous/anxious.        Objective     Physical Exam  Vitals and nursing note reviewed.   Constitutional:       Appearance: Normal appearance. She is obese.   HENT:      Head: Normocephalic and atraumatic.      Mouth/Throat:      Mouth: Mucous membranes are dry.   Eyes:      Extraocular Movements: Extraocular movements intact.      Conjunctiva/sclera: Conjunctivae normal.   Cardiovascular:      Rate and Rhythm: Normal rate.      Pulses: Normal pulses.   Pulmonary:      Effort: Pulmonary effort is normal. No respiratory distress.      Breath sounds: Normal breath sounds.      Comments: Lung exam is clear.  No wheezing.  Abdominal:      General: Bowel sounds are normal. There is no distension.      Palpations: Abdomen is soft. There is no mass.   Musculoskeletal:         General: Normal range of motion.      Cervical back: Normal range of motion and neck supple.   Skin:     General: Skin is warm and dry.   Neurological:      General: No focal deficit present.      Mental Status: She is alert. Mental status is at baseline.   Psychiatric:         Mood and Affect: Mood normal.         Assessment/Plan     Cough.  We will obtain chest x-ray to rule out cardiopulmonary process.  We will prescribe Tessalon Perle. She will call or go to the ER if her cough worsens     Chronic sinusitis.  Patient is following up with ENT and had a visit yesterday.  She currently on antibiotics She will continue.    Anxiety and depression/bipolar.  Patient advised to follow-up with her psychiatrist.  We will prescribe additional 1 month of fluoxetine.  EKG obtained 07/2023 which showed normal QTc limits.  Patient is not suicidal she is currently seeing a psychologist.  We provided additional local mental health resources to the patient should she need it.  Problem List Items Addressed This Visit       Bipolar 1 disorder (CMS/[facility])    Relevant Orders    Follow Up In [facility] - PCP - Established    Anxiety and  depression    Relevant Medications    FLUoxetine (PROzac) 40 mg capsule    FLUoxetine (PROzac) 10 mg capsule    Other Relevant Orders    Follow Up In [facility] - PCP - Established    Chronic sinusitis - Primary    Relevant Orders    Follow Up In [facility] - PCP - Established     Other Visit Diagnoses       Other cough        Relevant Medications    benzonatate (Tessalon) 200 mg capsule    Other Relevant Orders    Follow Up In [facility] - PCP - Established    XR chest 2 views    Morbid obesity (CMS/HCC)        Relevant Orders    Follow Up In [facility] - PCP - Established    Referral to Nutrition Services

## 2023-11-05 ASSESSMENT — ENCOUNTER SYMPTOMS
FREQUENCY: 1
ANOREXIA: 0
VOMITING: 0
HEADACHES: 0
FEVER: 0
HEMATURIA: 0
FLANK PAIN: 0
ABDOMINAL PAIN: 0
SORE THROAT: 0
DYSURIA: 0
CHILLS: 0
CONSTIPATION: 0
NAUSEA: 0
BACK PAIN: 0
DIARRHEA: 0

## 2023-11-06 ENCOUNTER — LAB (OUTPATIENT)
Dept: LAB | Facility: LAB | Age: 28
End: 2023-11-06
Payer: COMMERCIAL

## 2023-11-06 ENCOUNTER — OFFICE VISIT (OUTPATIENT)
Dept: OBSTETRICS AND GYNECOLOGY | Facility: CLINIC | Age: 28
End: 2023-11-06
Payer: COMMERCIAL

## 2023-11-06 VITALS
WEIGHT: 222 LBS | HEIGHT: 60 IN | SYSTOLIC BLOOD PRESSURE: 118 MMHG | BODY MASS INDEX: 43.59 KG/M2 | DIASTOLIC BLOOD PRESSURE: 68 MMHG

## 2023-11-06 DIAGNOSIS — N89.8 VAGINAL ITCHING: Primary | ICD-10-CM

## 2023-11-06 DIAGNOSIS — N89.8 VAGINAL ITCHING: ICD-10-CM

## 2023-11-06 DIAGNOSIS — R10.2 VULVAR PAIN: ICD-10-CM

## 2023-11-06 PROCEDURE — 86695 HERPES SIMPLEX TYPE 1 TEST: CPT

## 2023-11-06 PROCEDURE — 36415 COLL VENOUS BLD VENIPUNCTURE: CPT

## 2023-11-06 PROCEDURE — 1036F TOBACCO NON-USER: CPT | Performed by: OBSTETRICS & GYNECOLOGY

## 2023-11-06 PROCEDURE — 86694 HERPES SIMPLEX NES ANTBDY: CPT

## 2023-11-06 PROCEDURE — 99203 OFFICE O/P NEW LOW 30 MIN: CPT | Performed by: OBSTETRICS & GYNECOLOGY

## 2023-11-06 PROCEDURE — 86696 HERPES SIMPLEX TYPE 2 TEST: CPT

## 2023-11-06 ASSESSMENT — ENCOUNTER SYMPTOMS
FLANK PAIN: 0
FEVER: 0
CONSTIPATION: 0
DIARRHEA: 0
BACK PAIN: 0
VOMITING: 0
DYSURIA: 0
ABDOMINAL PAIN: 0
HEMATURIA: 0
NAUSEA: 0
HEADACHES: 0
FREQUENCY: 1
CHILLS: 0
SORE THROAT: 0

## 2023-11-06 NOTE — PATIENT INSTRUCTIONS
Vaginal itching, burning, and/or discharge in the Premenopausal Patient:  You were seen in office today for vaginal discharge, itching, burning, and odor.   In premenopausal women these symptoms may be a sign of vaginitis or STD infection, change in vaginal pH, contact irritant/allergic reaction  A BV and yeast culture was not sent today as symptoms have resolved  STD screening was done today (HSV testing), you will be notified of results by phone call/MyChart  You can use Aquaphor or A&D ointment for comfort until we get the results of your cultures. Rephresh vaginal gel is a vaginal acidifying agent that can help with pH and can be bought over the counter in most pharmacies. If you have frequent vaginitis symptoms oral probiotics that contain Lactobacillus can sometimes help: Rephresh Pro-B, Honey Pot, Uqora    Continue to abstain from soap/douching products in the vagina, and to use loose fitting cotton underwear  If symptoms continue please call the office or return for reassessment [phone] ([place]) or [phone] ([place])

## 2023-11-06 NOTE — PROGRESS NOTES
"Subjective   Patient ID: Saskia Winn is a 27 y.o. female who presents for New Patient Visit.      Patient presents for complaint of vulvar pain, itching, and bleeding. The patient first noted vulvar pain that was more prominent on her left about 2 weeks ago. She states she noted intermittent pruritus of the vulva. When she had episodes of itching it would be significantly intense to the point scratching did not help. She noted bleeding that seemed to be coming from the vulva instead of the vaginal, and consisted mostly of spotting. She did not use any OTC medications and the vulvar symptoms resolved within a week. She noted a thick white discharge during this time, and a slightly \"off\" odor. She denies new sexual partners but has a current prospective partner. She has a history of 30 prior partners with no penetrative sex, during manic episodes (BPD 2). She denies new detergents, hygiene products, douching, personal lubricants.    Review of Systems   Constitutional:  Negative for chills and fever.   HENT:  Negative for sore throat.    Gastrointestinal:  Negative for abdominal pain, constipation, diarrhea, nausea and vomiting.   Genitourinary:  Positive for frequency and vaginal discharge. Negative for dysuria, flank pain, hematuria, pelvic pain and urgency.   Musculoskeletal:  Negative for back pain.   Skin:  Negative for rash.   Neurological:  Negative for headaches.       Objective   Visit Vitals  /68   Ht 1.525 m (5' 0.05\")   Wt 101 kg (222 lb)   BMI 43.28 kg/m²   OB Status Implant   Smoking Status Never   BSA 2.07 m²      Physical Exam  Constitutional:       Appearance: Normal appearance.   Pulmonary:      Effort: Pulmonary effort is normal.   Genitourinary:     Comments: Vulva normal in appearance without discoloration, rashes, lesions. Vagina is negative for blood, discharge, lesions. Uterus is small, mobile, non tender. There is no cervical motion tenderness, adnexal masses/tenderness. "       Neurological:      Mental Status: She is alert.   Psychiatric:         Mood and Affect: Mood normal.         Assessment/Plan   Problem List Items Addressed This Visit    None  Visit Diagnoses         Codes    Vaginal itching    -  Primary N89.8    1 week of itching/burning  Resolution at this time  Possible yeast, dermatoses, HSV  No tx at this time. Same day apt if recurs     Vulvar pain     R10.2    Resolved in 1 week         Nay Anthony, DO

## 2023-11-07 ENCOUNTER — HOSPITAL ENCOUNTER (OUTPATIENT)
Dept: RADIOLOGY | Facility: HOSPITAL | Age: 28
Discharge: HOME | End: 2023-11-07
Payer: COMMERCIAL

## 2023-11-07 DIAGNOSIS — J32.9 CHRONIC SINUSITIS, UNSPECIFIED LOCATION: ICD-10-CM

## 2023-11-07 LAB
HERPES SIMPLEX VIRUS 1 IGG: 0.2 INDEX
HERPES SIMPLEX VIRUS 2 IGG: <0.2 INDEX

## 2023-11-07 PROCEDURE — 70486 CT MAXILLOFACIAL W/O DYE: CPT | Performed by: RADIOLOGY

## 2023-11-07 PROCEDURE — 70486 CT MAXILLOFACIAL W/O DYE: CPT

## 2023-11-08 LAB — HSV1+2 IGM SER IA-ACNC: 0.32 IV

## 2023-12-25 DIAGNOSIS — K21.9 GASTROESOPHAGEAL REFLUX DISEASE, UNSPECIFIED WHETHER ESOPHAGITIS PRESENT: Primary | ICD-10-CM

## 2023-12-28 RX ORDER — OMEPRAZOLE 40 MG/1
40 CAPSULE, DELAYED RELEASE ORAL EVERY MORNING
Qty: 30 CAPSULE | Refills: 0 | Status: SHIPPED | OUTPATIENT
Start: 2023-12-28 | End: 2024-01-25 | Stop reason: SDUPTHER

## 2024-01-25 ENCOUNTER — LAB (OUTPATIENT)
Dept: LAB | Facility: LAB | Age: 29
End: 2024-01-25
Payer: COMMERCIAL

## 2024-01-25 ENCOUNTER — OFFICE VISIT (OUTPATIENT)
Dept: PRIMARY CARE | Facility: CLINIC | Age: 29
End: 2024-01-25
Payer: COMMERCIAL

## 2024-01-25 VITALS
BODY MASS INDEX: 44.37 KG/M2 | DIASTOLIC BLOOD PRESSURE: 64 MMHG | HEART RATE: 87 BPM | SYSTOLIC BLOOD PRESSURE: 118 MMHG | WEIGHT: 226 LBS | HEIGHT: 60 IN

## 2024-01-25 DIAGNOSIS — Z00.00 ENCOUNTER FOR PREVENTIVE HEALTH EXAMINATION: Primary | ICD-10-CM

## 2024-01-25 DIAGNOSIS — F32.A ANXIETY AND DEPRESSION: ICD-10-CM

## 2024-01-25 DIAGNOSIS — K21.9 GASTROESOPHAGEAL REFLUX DISEASE, UNSPECIFIED WHETHER ESOPHAGITIS PRESENT: ICD-10-CM

## 2024-01-25 DIAGNOSIS — F31.9 BIPOLAR 1 DISORDER (MULTI): ICD-10-CM

## 2024-01-25 DIAGNOSIS — Z00.00 ENCOUNTER FOR PREVENTIVE HEALTH EXAMINATION: ICD-10-CM

## 2024-01-25 DIAGNOSIS — E66.01 CLASS 3 SEVERE OBESITY WITHOUT SERIOUS COMORBIDITY WITH BODY MASS INDEX (BMI) OF 40.0 TO 44.9 IN ADULT, UNSPECIFIED OBESITY TYPE (MULTI): ICD-10-CM

## 2024-01-25 DIAGNOSIS — F41.9 ANXIETY AND DEPRESSION: ICD-10-CM

## 2024-01-25 DIAGNOSIS — F31.81 BIPOLAR 2 DISORDER (MULTI): ICD-10-CM

## 2024-01-25 DIAGNOSIS — Z23 ENCOUNTER FOR IMMUNIZATION: ICD-10-CM

## 2024-01-25 DIAGNOSIS — K21.9 GASTROESOPHAGEAL REFLUX DISEASE WITHOUT ESOPHAGITIS: ICD-10-CM

## 2024-01-25 DIAGNOSIS — R06.83 SNORING: ICD-10-CM

## 2024-01-25 DIAGNOSIS — R74.8 ELEVATED LIVER ENZYMES: ICD-10-CM

## 2024-01-25 DIAGNOSIS — R53.83 FATIGUE, UNSPECIFIED TYPE: ICD-10-CM

## 2024-01-25 PROBLEM — H69.90 EUSTACHIAN TUBE DYSFUNCTION: Status: RESOLVED | Noted: 2023-10-04 | Resolved: 2024-01-25

## 2024-01-25 PROBLEM — H93.8X3 IRRITATION OF EAR, BILATERAL: Status: RESOLVED | Noted: 2023-07-24 | Resolved: 2024-01-25

## 2024-01-25 LAB
ALBUMIN SERPL BCP-MCNC: 4.3 G/DL (ref 3.4–5)
ALP SERPL-CCNC: 48 U/L (ref 33–110)
ALT SERPL W P-5'-P-CCNC: 100 U/L (ref 7–45)
AST SERPL W P-5'-P-CCNC: 38 U/L (ref 9–39)
BILIRUB DIRECT SERPL-MCNC: 0 MG/DL (ref 0–0.3)
BILIRUB SERPL-MCNC: 0.3 MG/DL (ref 0–1.2)
PROT SERPL-MCNC: 6.9 G/DL (ref 6.4–8.2)

## 2024-01-25 PROCEDURE — 3008F BODY MASS INDEX DOCD: CPT | Performed by: STUDENT IN AN ORGANIZED HEALTH CARE EDUCATION/TRAINING PROGRAM

## 2024-01-25 PROCEDURE — 86015 ACTIN ANTIBODY EACH: CPT

## 2024-01-25 PROCEDURE — 86256 FLUORESCENT ANTIBODY TITER: CPT

## 2024-01-25 PROCEDURE — 99214 OFFICE O/P EST MOD 30 MIN: CPT | Performed by: STUDENT IN AN ORGANIZED HEALTH CARE EDUCATION/TRAINING PROGRAM

## 2024-01-25 PROCEDURE — 86381 MITOCHONDRIAL ANTIBODY EACH: CPT

## 2024-01-25 PROCEDURE — 90471 IMMUNIZATION ADMIN: CPT | Performed by: STUDENT IN AN ORGANIZED HEALTH CARE EDUCATION/TRAINING PROGRAM

## 2024-01-25 PROCEDURE — 80074 ACUTE HEPATITIS PANEL: CPT

## 2024-01-25 PROCEDURE — 86038 ANTINUCLEAR ANTIBODIES: CPT

## 2024-01-25 PROCEDURE — 90715 TDAP VACCINE 7 YRS/> IM: CPT | Performed by: STUDENT IN AN ORGANIZED HEALTH CARE EDUCATION/TRAINING PROGRAM

## 2024-01-25 PROCEDURE — 82390 ASSAY OF CERULOPLASMIN: CPT

## 2024-01-25 PROCEDURE — 80076 HEPATIC FUNCTION PANEL: CPT

## 2024-01-25 PROCEDURE — 99395 PREV VISIT EST AGE 18-39: CPT | Performed by: STUDENT IN AN ORGANIZED HEALTH CARE EDUCATION/TRAINING PROGRAM

## 2024-01-25 PROCEDURE — 1036F TOBACCO NON-USER: CPT | Performed by: STUDENT IN AN ORGANIZED HEALTH CARE EDUCATION/TRAINING PROGRAM

## 2024-01-25 PROCEDURE — 82103 ALPHA-1-ANTITRYPSIN TOTAL: CPT

## 2024-01-25 RX ORDER — FLUOXETINE HYDROCHLORIDE 40 MG/1
40 CAPSULE ORAL DAILY
Qty: 90 CAPSULE | Refills: 1 | Status: SHIPPED | OUTPATIENT
Start: 2024-01-25 | End: 2024-07-23

## 2024-01-25 RX ORDER — ONDANSETRON 4 MG/1
4 TABLET, FILM COATED ORAL EVERY 8 HOURS PRN
Qty: 20 TABLET | Refills: 0 | Status: SHIPPED | OUTPATIENT
Start: 2024-01-25 | End: 2024-02-01

## 2024-01-25 RX ORDER — FLUOXETINE 10 MG/1
10 CAPSULE ORAL DAILY
Qty: 90 CAPSULE | Refills: 1 | Status: SHIPPED | OUTPATIENT
Start: 2024-01-25 | End: 2024-05-13 | Stop reason: ALTCHOICE

## 2024-01-25 RX ORDER — OMEPRAZOLE 40 MG/1
40 CAPSULE, DELAYED RELEASE ORAL EVERY MORNING
Qty: 90 CAPSULE | Refills: 1 | Status: SHIPPED | OUTPATIENT
Start: 2024-01-25 | End: 2024-05-13 | Stop reason: SDUPTHER

## 2024-01-25 RX ORDER — OMEPRAZOLE 40 MG/1
40 CAPSULE, DELAYED RELEASE ORAL EVERY MORNING
Qty: 30 CAPSULE | Refills: 0 | Status: SHIPPED | OUTPATIENT
Start: 2024-01-25 | End: 2024-01-25 | Stop reason: SDUPTHER

## 2024-01-25 NOTE — PROGRESS NOTES
Subjective   Patient ID: Saskia Winn is a 28 y.o. female who presents for New Patient Visit.  Today she is accompanied by alone.     HPI  New patient visit/health maintenance  Overall patient is doing well.   Immunization: Tdap ##  Influenza vaccine up-to-date  COVID-19 vaccine (Pfizer Moderna) 2 doses  Colon Cancer Screening: Data colonoscopy in 2020 which was normal per patient  OB/GYN: Pap smear not up-to-date needs new referral for OB/GYN  Diet: Tries to eat a well-balanced diet but admits there is much room for improvement  Exercise: Does not exercise  Tobacco: Denies use  EtOH: Rarely Socially      2.  Bipolar II with depression  Patient has a long history of being diagnosed with bipolar II disorder.  Her symptoms are depressive with depressive mood and increased appetite and sleep.  Also she has decreased motivation and anhedonia.  Patient has been treated for a long time with fluoxetine 50 mg daily.  Admits that he is doing fine on fluoxetine, denies any manic episodes.  Denies any SI HI    3.  Gastritis and GERD  Patient was diagnosed with gastritis in 2020.  Since then she has been experiencing flareup episodes with vomiting and sometimes diarrhea.  She has been using Pepto-Bismol and omeprazole which seems to help with her symptoms.  She was in remission for long period of time until 1 month ago when symptoms returned with vomiting and diarrhea.  Improved for few weeks and then 1 week ago returned with several episodes of nausea and vomiting and 8 episodes per day diarrhea.  The vomiting and diarrhea have been non-bloody, and have gradually improved.  Patient follows with the GI and has an appointment in March.    3.  Transaminitis/elevated liver enzymes  Patient has a history of elevated liver enzymes, last CMP done in July 2023 showed ALT elevated 130 and AST elevated 63.  Patient denies any excessive alcohol or Tylenol use.  Did not follow with specialist at that time.    4.  Snoring and  fatigue  Patient admits that although she is able to sleep 8 to 10 hours per night she will always wake up feeling fatigue and caring that feeling throughout the day.  Admits she snores, lives on her own, so cannot provide whether she has breathing cessation during sleep.  Willing to investigate further with sleep study    Current Outpatient Medications on File Prior to Visit   Medication Sig Dispense Refill    cholecalciferol (Vitamin D-3) 5,000 Units tablet Take by mouth.      FLUoxetine (PROzac) 10 mg capsule Take 1 capsule (10 mg) by mouth once daily. 30 capsule 2    FLUoxetine (PROzac) 40 mg capsule Take 1 capsule (40 mg) by mouth once daily. 30 capsule 0    [DISCONTINUED] omeprazole (PriLOSEC) 40 mg DR capsule Take 1 capsule by mouth once daily in the morning 30 capsule 0     No current facility-administered medications on file prior to visit.        Allergies   Allergen Reactions    Augmentin [Amoxicillin-Pot Clavulanate] Anaphylaxis    Penicillins Anaphylaxis and Hives       Immunization History   Administered Date(s) Administered    DTaP, Unspecified 01/14/1997, 03/07/1997, 05/16/1997, 05/15/1998, 08/03/1998, 11/26/2001    Flu vaccine (IIV4), preservative free *Check age/dose* 10/11/2022    HPV, Quadrivalent 08/03/1998, 07/17/2012, 10/12/2012, 03/07/2013    Hepatitis B vaccine, adult (RECOMBIVAX, ENGERIX) 1996    Hepatitis B vaccine, pediatric/adolescent (RECOMBIVAX, ENGERIX) 01/14/1997, 08/06/1997    HiB PRP-T conjugate vaccine (HIBERIX, ACTHIB) 01/14/1997, 03/07/1997, 06/04/1997, 08/03/1998    MMR vaccine, subcutaneous (MMR II) 11/26/1997, 11/06/2000, 11/16/2000    Meningococcal MCV4P 08/02/2012, 06/23/2015    Moderna SARS-CoV-2 Vaccination 04/07/2021, 05/05/2021    OPV 01/14/1997, 03/07/1997, 05/16/1997, 11/26/2001    Poliovirus vaccine, subcutaneous (IPOL) 01/14/1997, 03/07/1997, 05/16/1997, 11/26/2001    Tdap vaccine, age 7 year and older (BOOSTRIX) 07/17/2012    Varicella vaccine, subcutaneous  (VARIVAX) 08/03/1998, 06/23/2015         Review of Systems  All pertinent positive symptoms are included in the history of present illness.  All other systems have been reviewed and are negative and noncontributory to this patient's current ailments.     Objective   /64   Pulse 87   Ht 1.524 m (5')   Wt 103 kg (226 lb)   BMI 44.14 kg/m²   BSA: 2.09 meters squared  No visits with results within 1 Month(s) from this visit.   Latest known visit with results is:   Lab on 11/06/2023   Component Date Value Ref Range Status    HSV 1, IgG 11/06/2023 0.2  <0.9 INDEX Final    NEGATIVE <0.9  EQUIVOCAL >=0.90 <=1.10  POSITIVE >1.10    HSV 2, IgG 11/06/2023 <0.2  <0.9 INDEX Final    NEGATIVE <0.9  EQUIVOCAL >=0.90 <=1.10  POSITIVE >1.10    Herpes simplex virus I/II Antibody* 11/06/2023 0.32  <=0.89 IV Final    INTERPRETIVE INFORMATION: Herpes Simplex Virus Type 1 and/or 2   Antibodies, IgM by FRANCOISE      0.89 IV or Less .......... Not Detected    0.90 - 1.09 IV ........... Indeterminate- Repeat testing in                               10-14 days may be helpful.    1.10 IV or Greater ....... Detected-IgM antibody to HSV                               detected, which may indicate a                               current or recent infection.                               However, low levels of IgM                               antibodies may occasionally                               persist for more than 12                               months post-infection.  Performed By: [facility]  [address] [place] [id number]  : Rui Maddox MD, PhD  CLIA Number: [id number]       Physical Exam  CONSTITUTIONAL - well nourished, well developed, looks like stated age, in no acute distress, not ill-appearing, and not tired appearing  SKIN - normal skin color and pigmentation, normal skin turgor without rash, lesions, or nodules visualized  HEAD - no trauma, normocephalic  EYES - normal external  exam  CHEST -no distressed breathing, good effort  EXTREMITIES - no edema, no deformities  NEUROLOGICAL - normal balance, normal motor, no ataxia  PSYCHIATRIC - alert, pleasant and cordial, age-appropriate    Assessment/Plan   Diagnoses and all orders for this visit:  Encounter for preventive health examination  -     Hepatic function panel; Future    Gastroesophageal reflux disease without esophagitis  Continue to take omeprazole and Pepto-Bismol as prescribed  A prescription of Zofran 4 mg every 8 hours to control nausea and vomiting was sent to the pharmacy  Continue to follow-up with GI specialist as needed    Encounter for immunization  -     Tdap vaccine, age 7 years and older  Patient will receive Tdap today in office.  We discussed the benefits and side effects of the immunization  All questions were answered and the vaccine was administered.     Class 3 severe obesity without serious comorbidity with body mass index (BMI) of 40.0 to 44.9 in adult, unspecified obesity type (CMS/HCC)  -     Referral to Nutrition Services; Future  Elevated liver enzymes  -     Hepatitis panel, acute; Future  -     AAKASH with Reflex to ALECIA; Future  -     Anti-Smooth Muscle Antibody; Future  -     Ceruloplasmin; Future  -     Alpha-1-Antitrypsin; Future  -     Antimitochondrial antibody; Future    Bipolar 2 disorder (CMS/HCC)  Continue to take fluoxetine 50 mg daily  Refills were sent to the pharmacy  Snoring/Fatigue, unspecified type  Referral for sleep studies was provided today.  Please call to schedule appointment at your earliest possible convenience.    Thank you for letting us be a part of your care team.  Please call the office if you have further questions or concerns regarding your care    Otherwise, please follow-up in 6 months for continued care and refills     Riya Manriquez MD  PGY1 FM Resident

## 2024-01-26 LAB
A1AT SERPL NEPH-MCNC: 131 MG/DL (ref 84–218)
CERULOPLASMIN SERPL-MCNC: 27.3 MG/DL (ref 20–60)
HAV IGM SER QL: NONREACTIVE
HBV CORE IGM SER QL: NONREACTIVE
HBV SURFACE AG SERPL QL IA: NONREACTIVE
HCV AB SER QL: NONREACTIVE

## 2024-01-28 LAB
ANA SER QL HEP2 SUBST: NEGATIVE
MITOCHONDRIA AB SER QL IF: NEGATIVE

## 2024-01-29 LAB — SMOOTH MUSCLE AB SER QL IF: ABNORMAL

## 2024-01-30 DIAGNOSIS — R74.8 ELEVATED LIVER ENZYMES: Primary | ICD-10-CM

## 2024-01-30 DIAGNOSIS — R76.0 RAISED ANTIBODY TITER: ICD-10-CM

## 2024-02-01 ENCOUNTER — APPOINTMENT (OUTPATIENT)
Dept: PRIMARY CARE | Facility: CLINIC | Age: 29
End: 2024-02-01
Payer: COMMERCIAL

## 2024-03-06 ENCOUNTER — APPOINTMENT (OUTPATIENT)
Dept: GASTROENTEROLOGY | Facility: CLINIC | Age: 29
End: 2024-03-06
Payer: COMMERCIAL

## 2024-03-08 ENCOUNTER — APPOINTMENT (OUTPATIENT)
Dept: GASTROENTEROLOGY | Facility: CLINIC | Age: 29
End: 2024-03-08
Payer: COMMERCIAL

## 2024-03-20 ENCOUNTER — OFFICE VISIT (OUTPATIENT)
Dept: GASTROENTEROLOGY | Facility: CLINIC | Age: 29
End: 2024-03-20
Payer: COMMERCIAL

## 2024-03-20 VITALS — OXYGEN SATURATION: 98 % | HEIGHT: 60 IN | WEIGHT: 229 LBS | BODY MASS INDEX: 44.96 KG/M2 | HEART RATE: 66 BPM

## 2024-03-20 DIAGNOSIS — R74.8 ELEVATED LIVER ENZYMES: ICD-10-CM

## 2024-03-20 DIAGNOSIS — K21.9 GASTROESOPHAGEAL REFLUX DISEASE, UNSPECIFIED WHETHER ESOPHAGITIS PRESENT: Primary | ICD-10-CM

## 2024-03-20 DIAGNOSIS — K58.9 IRRITABLE BOWEL SYNDROME, UNSPECIFIED TYPE: ICD-10-CM

## 2024-03-20 DIAGNOSIS — Z80.0 FAMILY HX OF COLON CANCER: ICD-10-CM

## 2024-03-20 PROCEDURE — 1036F TOBACCO NON-USER: CPT

## 2024-03-20 PROCEDURE — 3008F BODY MASS INDEX DOCD: CPT

## 2024-03-20 PROCEDURE — 99203 OFFICE O/P NEW LOW 30 MIN: CPT

## 2024-03-20 RX ORDER — ONDANSETRON 4 MG/1
TABLET, FILM COATED ORAL
COMMUNITY
Start: 2024-02-25

## 2024-03-20 ASSESSMENT — ENCOUNTER SYMPTOMS
CHILLS: 0
APPETITE CHANGE: 0
FATIGUE: 0
DIARRHEA: 1
BLOOD IN STOOL: 0
ANAL BLEEDING: 0
CONSTIPATION: 1
TROUBLE SWALLOWING: 0
VOMITING: 0
RECTAL PAIN: 0
SHORTNESS OF BREATH: 0
COUGH: 0
NAUSEA: 0
ABDOMINAL DISTENTION: 0
ABDOMINAL PAIN: 0
FEVER: 0

## 2024-03-20 NOTE — PATIENT INSTRUCTIONS
Try to minimize acidic foods such as citrus fruits, tomatoes, and pop. Also try to avoid chocolate, peppermint, alcohol, and caffeine.  Avoid eating within 2-3 hours of lying down.   Eat more frequent smaller meals instead of a few large meals.  You can prop the head of your bed up when you are sleeping to decrease reflux.  Continue daily Omeprazole to control your stomach acid  Please take 1-2 spoonfuls daily of fiber.  Dissolve in 8 oz liquid to help normalize your bowels  Contact me if you liver ultrasound is abnormal  Follow up if symptoms are not controlled

## 2024-03-20 NOTE — ASSESSMENT & PLAN NOTE
Known history of chronic GERD controlled on 40 mg omeprazole  -Continue current dosing  -Antireflux regimen recommended  -Consider EGD if symptoms recur    Follow-up as needed

## 2024-03-20 NOTE — ASSESSMENT & PLAN NOTE
PCP ordered liver ultrasound.  Consider hepatic steatosis  Recommended patient update me if any abnormal findings

## 2024-03-20 NOTE — ASSESSMENT & PLAN NOTE
Symptoms most suggestive of IBS  Increase fiber supplement to twice daily  Recommended dietary modifications

## 2024-03-20 NOTE — PROGRESS NOTES
Subjective     History of Present Illness:   Saskia Winn is a 28 y.o. female with PMHx of morbid obesity, bipolar, GERD, elevated LFTs who presents to GI clinic for further evaluation gastritis  Liver ultrasound ordered 1/2024, pending.   other LFTs normal    Today, patient reports since 2020 started randomly getting sick and could barely move without vomiting.  Was diagnosed with gastritis with endoscopy.  Then, episodes started again with vomiting and diarrhea.  Currently taking omeprazole for the past few months and has not had incidences since starting again.  Feels entire GI system is off.  Moving bowels 2-3 times daily varying from loose to hard. Taking daily fiber, which she feels is helping.  Eats twice daily.  Takes prn Pepto.  Diet is poor and has been referred to nutritionist.  States she is getting ultrasound of liver.  Denies regular NSAID or Tylenol use.  Lives a very stressful life and is working on better managing. Denies current abdominal pain, dyspepsia, melena, hematochezia, dysphagia, unintentional weight loss    Rare ETOH, denies smoking, once monthly marijuana  Denies fxh GI: paternal grandfather cancer.  Denies fhx IBD  Abdominal Surgeries: denies    Last colonoscopy 5/2020- reports hemorrhoid was cauterized  Last EGD 5/2020  general- no report      Past Medical History  As per HPI.     Social History  she  reports that she has never smoked. She has never used smokeless tobacco. She reports current alcohol use. She reports current drug use. Drug: Marijuana.     Family History  her family history is not on file.     Review of Systems  Review of Systems   Constitutional:  Negative for appetite change, chills, fatigue and fever.   HENT:  Negative for trouble swallowing.    Respiratory:  Negative for cough and shortness of breath.    Gastrointestinal:  Positive for constipation and diarrhea. Negative for abdominal distention, abdominal pain, anal bleeding, blood in stool, nausea, rectal  pain and vomiting.       Allergies  Allergies   Allergen Reactions    Augmentin [Amoxicillin-Pot Clavulanate] Anaphylaxis    Penicillins Anaphylaxis and Hives       Medications  Current Outpatient Medications   Medication Instructions    cholecalciferol (Vitamin D-3) 5,000 Units tablet oral    FLUoxetine (PROZAC) 40 mg, oral, Daily    FLUoxetine (PROZAC) 10 mg, oral, Daily    omeprazole (PRILOSEC) 40 mg, oral, Every morning    ondansetron (Zofran) 4 mg tablet TAKE 1 TABLET BY MOUTH EVERY 8 HOURS AS NEEDED FOR NAUSEA OR VOMITING FOR UP TO 7 DAYS        Objective   Visit Vitals  Pulse 66      Physical Exam  Constitutional:       Appearance: Normal appearance. She is obese.   HENT:      Mouth/Throat:      Mouth: Mucous membranes are dry.      Pharynx: Oropharynx is clear.   Cardiovascular:      Rate and Rhythm: Normal rate and regular rhythm.   Pulmonary:      Effort: Pulmonary effort is normal.      Breath sounds: Normal breath sounds. No wheezing or rhonchi.   Abdominal:      General: Abdomen is flat. Bowel sounds are normal. There is no distension.      Palpations: Abdomen is soft. There is no hepatomegaly.      Tenderness: There is no abdominal tenderness. There is no guarding or rebound. Negative signs include Wilson's sign.      Hernia: No hernia is present.   Musculoskeletal:         General: Normal range of motion.   Skin:     General: Skin is warm and dry.   Neurological:      General: No focal deficit present.      Mental Status: She is alert and oriented to person, place, and time.   Psychiatric:         Mood and Affect: Mood normal.         Behavior: Behavior normal.           Lab Results   Component Value Date    WBC 9.0 07/24/2023    HGB 14.0 07/24/2023    HCT 42.5 07/24/2023     07/24/2023     Lab Results   Component Value Date     07/24/2023    K 4.7 07/24/2023     07/24/2023    CO2 28 07/24/2023    BUN 12 07/24/2023    CREATININE 0.90 07/24/2023    CALCIUM 9.5 07/24/2023    PROT 6.9  01/25/2024    PROT 6.9 07/24/2023    BILITOT 0.3 01/25/2024    BILITOT 0.5 07/24/2023    ALKPHOS 48 01/25/2024    ALKPHOS 40 07/24/2023     (H) 01/25/2024     (H) 07/24/2023    AST 38 01/25/2024    AST 63 (H) 07/24/2023    GLUCOSE 77 07/24/2023           Saskia Winn is a 28 y.o. female who presents to GI clinic for IBS and GERD, elevated LFT.    Gastroesophageal reflux disease  Known history of chronic GERD controlled on 40 mg omeprazole  -Continue current dosing  -Antireflux regimen recommended  -Consider EGD if symptoms recur    Follow-up as needed    Irritable bowel syndrome  Symptoms most suggestive of IBS  Increase fiber supplement to twice daily  Recommended dietary modifications    Elevated liver enzymes  PCP ordered liver ultrasound.  Consider hepatic steatosis  Recommended patient update me if any abnormal findings         Luann Craig, APRN-CNP

## 2024-04-17 DIAGNOSIS — R19.7 DIARRHEA, UNSPECIFIED TYPE: Primary | ICD-10-CM

## 2024-04-17 DIAGNOSIS — R11.2 NAUSEA AND VOMITING, UNSPECIFIED VOMITING TYPE: ICD-10-CM

## 2024-05-13 ENCOUNTER — LAB (OUTPATIENT)
Dept: LAB | Facility: LAB | Age: 29
End: 2024-05-13
Payer: COMMERCIAL

## 2024-05-13 ENCOUNTER — OFFICE VISIT (OUTPATIENT)
Dept: PRIMARY CARE | Facility: CLINIC | Age: 29
End: 2024-05-13
Payer: COMMERCIAL

## 2024-05-13 VITALS
HEART RATE: 68 BPM | SYSTOLIC BLOOD PRESSURE: 122 MMHG | WEIGHT: 227 LBS | DIASTOLIC BLOOD PRESSURE: 78 MMHG | BODY MASS INDEX: 42.86 KG/M2 | HEIGHT: 61 IN

## 2024-05-13 DIAGNOSIS — Z76.89 ENCOUNTER TO ESTABLISH CARE: ICD-10-CM

## 2024-05-13 DIAGNOSIS — F41.9 ANXIETY AND DEPRESSION: ICD-10-CM

## 2024-05-13 DIAGNOSIS — E55.9 VITAMIN D DEFICIENCY: Primary | ICD-10-CM

## 2024-05-13 DIAGNOSIS — F31.9 BIPOLAR 1 DISORDER (MULTI): ICD-10-CM

## 2024-05-13 DIAGNOSIS — F32.A ANXIETY AND DEPRESSION: ICD-10-CM

## 2024-05-13 DIAGNOSIS — R74.8 ELEVATED LIVER ENZYMES: ICD-10-CM

## 2024-05-13 DIAGNOSIS — E66.01 MORBID OBESITY (MULTI): ICD-10-CM

## 2024-05-13 DIAGNOSIS — K21.9 GASTROESOPHAGEAL REFLUX DISEASE, UNSPECIFIED WHETHER ESOPHAGITIS PRESENT: ICD-10-CM

## 2024-05-13 LAB
ALBUMIN SERPL BCP-MCNC: 4.4 G/DL (ref 3.4–5)
ALP SERPL-CCNC: 55 U/L (ref 33–110)
ALT SERPL W P-5'-P-CCNC: 153 U/L (ref 7–45)
ANION GAP SERPL CALC-SCNC: 13 MMOL/L (ref 10–20)
AST SERPL W P-5'-P-CCNC: 78 U/L (ref 9–39)
BILIRUB SERPL-MCNC: 0.4 MG/DL (ref 0–1.2)
BUN SERPL-MCNC: 13 MG/DL (ref 6–23)
CALCIUM SERPL-MCNC: 9.4 MG/DL (ref 8.6–10.3)
CHLORIDE SERPL-SCNC: 104 MMOL/L (ref 98–107)
CO2 SERPL-SCNC: 24 MMOL/L (ref 21–32)
CREAT SERPL-MCNC: 0.85 MG/DL (ref 0.5–1.05)
EGFRCR SERPLBLD CKD-EPI 2021: >90 ML/MIN/1.73M*2
ERYTHROCYTE [DISTWIDTH] IN BLOOD BY AUTOMATED COUNT: 12.3 % (ref 11.5–14.5)
GLUCOSE SERPL-MCNC: 91 MG/DL (ref 74–99)
HCT VFR BLD AUTO: 43.9 % (ref 36–46)
HGB BLD-MCNC: 14.8 G/DL (ref 12–16)
MAGNESIUM SERPL-MCNC: 2.25 MG/DL (ref 1.6–2.4)
MCH RBC QN AUTO: 30.4 PG (ref 26–34)
MCHC RBC AUTO-ENTMCNC: 33.7 G/DL (ref 32–36)
MCV RBC AUTO: 90 FL (ref 80–100)
NRBC BLD-RTO: 0 /100 WBCS (ref 0–0)
PLATELET # BLD AUTO: 351 X10*3/UL (ref 150–450)
POTASSIUM SERPL-SCNC: 4.5 MMOL/L (ref 3.5–5.3)
PROT SERPL-MCNC: 7.3 G/DL (ref 6.4–8.2)
RBC # BLD AUTO: 4.87 X10*6/UL (ref 4–5.2)
SODIUM SERPL-SCNC: 136 MMOL/L (ref 136–145)
TSH SERPL-ACNC: 2.3 MIU/L (ref 0.44–3.98)
VIT B12 SERPL-MCNC: 288 PG/ML (ref 211–911)
WBC # BLD AUTO: 7.2 X10*3/UL (ref 4.4–11.3)

## 2024-05-13 PROCEDURE — 85027 COMPLETE CBC AUTOMATED: CPT

## 2024-05-13 PROCEDURE — 80053 COMPREHEN METABOLIC PANEL: CPT

## 2024-05-13 PROCEDURE — 82607 VITAMIN B-12: CPT

## 2024-05-13 PROCEDURE — 1036F TOBACCO NON-USER: CPT | Performed by: CLINICAL NURSE SPECIALIST

## 2024-05-13 PROCEDURE — 83036 HEMOGLOBIN GLYCOSYLATED A1C: CPT

## 2024-05-13 PROCEDURE — 99214 OFFICE O/P EST MOD 30 MIN: CPT | Performed by: CLINICAL NURSE SPECIALIST

## 2024-05-13 PROCEDURE — 3008F BODY MASS INDEX DOCD: CPT | Performed by: CLINICAL NURSE SPECIALIST

## 2024-05-13 PROCEDURE — 84443 ASSAY THYROID STIM HORMONE: CPT

## 2024-05-13 PROCEDURE — 83735 ASSAY OF MAGNESIUM: CPT

## 2024-05-13 PROCEDURE — 36415 COLL VENOUS BLD VENIPUNCTURE: CPT

## 2024-05-13 RX ORDER — FLUOXETINE 10 MG/1
10 CAPSULE ORAL DAILY
Qty: 90 CAPSULE | Refills: 1 | Status: SHIPPED | OUTPATIENT
Start: 2024-05-13 | End: 2024-11-09

## 2024-05-13 RX ORDER — OMEPRAZOLE 40 MG/1
40 CAPSULE, DELAYED RELEASE ORAL EVERY MORNING
Qty: 90 CAPSULE | Refills: 1 | Status: SHIPPED | OUTPATIENT
Start: 2024-05-13 | End: 2024-11-09

## 2024-05-13 ASSESSMENT — ENCOUNTER SYMPTOMS
LOSS OF SENSATION IN FEET: 0
WHEEZING: 0
FATIGUE: 0
WOUND: 0
SLEEP DISTURBANCE: 0
JOINT SWELLING: 0
SHORTNESS OF BREATH: 0
MYALGIAS: 0
DIARRHEA: 0
NECK PAIN: 0
ABDOMINAL PAIN: 0
HEMATURIA: 0
DYSURIA: 0
ARTHRALGIAS: 0
BACK PAIN: 0
ACTIVITY CHANGE: 0
PALPITATIONS: 0
FEVER: 0
EYE PAIN: 0
CHEST TIGHTNESS: 0
TROUBLE SWALLOWING: 0
OCCASIONAL FEELINGS OF UNSTEADINESS: 0
CHILLS: 0
CONFUSION: 0
CONSTIPATION: 0
FLANK PAIN: 0
BLOOD IN STOOL: 0
APPETITE CHANGE: 0
SEIZURES: 0
POLYDIPSIA: 0
SORE THROAT: 0
UNEXPECTED WEIGHT CHANGE: 0
PHOTOPHOBIA: 0
HEADACHES: 0
VOMITING: 0
BRUISES/BLEEDS EASILY: 0
NAUSEA: 0
DEPRESSION: 0
DIZZINESS: 0
COUGH: 0

## 2024-05-13 ASSESSMENT — ANXIETY QUESTIONNAIRES
3. WORRYING TOO MUCH ABOUT DIFFERENT THINGS: SEVERAL DAYS
GAD7 TOTAL SCORE: 3
4. TROUBLE RELAXING: NOT AT ALL
2. NOT BEING ABLE TO STOP OR CONTROL WORRYING: NOT AT ALL
IF YOU CHECKED OFF ANY PROBLEMS ON THIS QUESTIONNAIRE, HOW DIFFICULT HAVE THESE PROBLEMS MADE IT FOR YOU TO DO YOUR WORK, TAKE CARE OF THINGS AT HOME, OR GET ALONG WITH OTHER PEOPLE: NOT DIFFICULT AT ALL
1. FEELING NERVOUS, ANXIOUS, OR ON EDGE: MORE THAN HALF THE DAYS
5. BEING SO RESTLESS THAT IT IS HARD TO SIT STILL: NOT AT ALL
6. BECOMING EASILY ANNOYED OR IRRITABLE: NOT AT ALL
7. FEELING AFRAID AS IF SOMETHING AWFUL MIGHT HAPPEN: NOT AT ALL

## 2024-05-13 ASSESSMENT — COLUMBIA-SUICIDE SEVERITY RATING SCALE - C-SSRS
6. HAVE YOU EVER DONE ANYTHING, STARTED TO DO ANYTHING, OR PREPARED TO DO ANYTHING TO END YOUR LIFE?: NO
1. IN THE PAST MONTH, HAVE YOU WISHED YOU WERE DEAD OR WISHED YOU COULD GO TO SLEEP AND NOT WAKE UP?: NO
2. HAVE YOU ACTUALLY HAD ANY THOUGHTS OF KILLING YOURSELF?: NO

## 2024-05-13 ASSESSMENT — PATIENT HEALTH QUESTIONNAIRE - PHQ9
2. FEELING DOWN, DEPRESSED OR HOPELESS: SEVERAL DAYS
10. IF YOU CHECKED OFF ANY PROBLEMS, HOW DIFFICULT HAVE THESE PROBLEMS MADE IT FOR YOU TO DO YOUR WORK, TAKE CARE OF THINGS AT HOME, OR GET ALONG WITH OTHER PEOPLE: NOT DIFFICULT AT ALL
SUM OF ALL RESPONSES TO PHQ9 QUESTIONS 1 AND 2: 1
1. LITTLE INTEREST OR PLEASURE IN DOING THINGS: NOT AT ALL

## 2024-05-13 NOTE — PROGRESS NOTES
"Subjective   Patient ID: Saskia Winn is a 28 y.o. female who presents for New Patient Visit (Former Dr. Moe patient ).  HPI    New patient here today to establish care.  Transfer care from [place] and Dr. Moe.     Recurrent GI episodes. Uses Zofran PRN. Increased abdominal pain, N/V/D episodes. 24H, happening monthly. Omeprazole. Improved. Worsened again in January 2024. Most recent episode last Wednesday.      Fluoxetine from previous PCP. Anxiety/Depression/Bipolar. Previously diagnosed from Bipolar from NP in Psych. Counselor since she was 17. Concerned that she wants to \"dive deeper\" in her Mental Health. Unsure if there is another Diagnosis.     Review of Systems   Constitutional:  Negative for activity change, appetite change, chills, fatigue, fever and unexpected weight change.   HENT:  Negative for ear pain, hearing loss, nosebleeds, sore throat, tinnitus and trouble swallowing.    Eyes:  Negative for photophobia, pain and visual disturbance.   Respiratory:  Negative for cough, chest tightness, shortness of breath and wheezing.    Cardiovascular:  Negative for chest pain, palpitations and leg swelling.   Gastrointestinal:  Negative for abdominal pain, blood in stool, constipation, diarrhea, nausea and vomiting.   Endocrine: Negative for cold intolerance, heat intolerance, polydipsia and polyuria.   Genitourinary:  Negative for dysuria, flank pain and hematuria.   Musculoskeletal:  Negative for arthralgias, back pain, joint swelling, myalgias and neck pain.   Skin:  Negative for pallor, rash and wound.   Allergic/Immunologic: Negative for immunocompromised state.   Neurological:  Negative for dizziness, seizures and headaches.   Hematological:  Does not bruise/bleed easily.   Psychiatric/Behavioral:  Negative for confusion and sleep disturbance.        Objective   Physical Exam  Vitals and nursing note reviewed.   Constitutional:       General: She is not in acute distress.     Appearance: Normal " appearance.   HENT:      Head: Normocephalic.      Nose: Nose normal.   Eyes:      Conjunctiva/sclera: Conjunctivae normal.   Neck:      Vascular: No carotid bruit.   Cardiovascular:      Rate and Rhythm: Normal rate and regular rhythm.      Pulses: Normal pulses.      Heart sounds: Normal heart sounds.   Pulmonary:      Effort: Pulmonary effort is normal.      Breath sounds: Normal breath sounds.   Abdominal:      General: Bowel sounds are normal.      Palpations: Abdomen is soft.   Musculoskeletal:         General: Normal range of motion.      Cervical back: Normal range of motion.   Skin:     General: Skin is warm and dry.   Neurological:      Mental Status: She is alert and oriented to person, place, and time. Mental status is at baseline.   Psychiatric:         Mood and Affect: Mood normal.         Behavior: Behavior normal.         Assessment/Plan       Bipolar/Anxiety/Depression: [facility] referral for Diagnostic recommendations. Continue medication at current dosage until appointment.  Obesity: BMI: 42.89. Lifestyle changes recommended: Diet consisting of low fat foods, lean meats, high fiber, fresh fruits and vegetables. 150 min/ weekly aerobic exercise.   Vitamin D Deficiency: Updated lab work ordered.   Elevated Liver Enzymes: Labs ordered. Will follow up pending results.   GERD: Continue Omeprazole and following with GI.     Wellness: January 2024.    Flu Vaccine: October 2023.   Tdap: January 2024.   Colonoscopy/EGD: May 2020.   COVID Vaccine: May 2021.     TEO Velasquez-CNS 05/13/24 12:53 PM

## 2024-05-14 LAB
EST. AVERAGE GLUCOSE BLD GHB EST-MCNC: 108 MG/DL
HBA1C MFR BLD: 5.4 %

## 2024-05-16 ENCOUNTER — TELEPHONE (OUTPATIENT)
Dept: PRIMARY CARE | Facility: CLINIC | Age: 29
End: 2024-05-16
Payer: COMMERCIAL

## 2024-05-16 DIAGNOSIS — R74.8 ELEVATED LIVER ENZYMES: ICD-10-CM

## 2024-05-16 NOTE — TELEPHONE ENCOUNTER
----- Message from TEO Velasquez-CNS sent at 5/15/2024  9:47 PM EDT -----  Please call patient with lab results. Liver enzymes are elevated again. Will recommend that we check Liver US. Please order Liver US. A1C has improved to 5.4%. Will continue to monitor. B12 is on the low end. Recommend to start/increase Vitamin B12. Remaining blood work stable. Will follow up after results of imaging. Thank you!

## 2024-06-03 ENCOUNTER — APPOINTMENT (OUTPATIENT)
Dept: RADIOLOGY | Facility: HOSPITAL | Age: 29
End: 2024-06-03
Payer: COMMERCIAL

## 2024-06-10 ENCOUNTER — HOSPITAL ENCOUNTER (OUTPATIENT)
Dept: RADIOLOGY | Facility: HOSPITAL | Age: 29
Discharge: HOME | End: 2024-06-10
Payer: COMMERCIAL

## 2024-06-10 DIAGNOSIS — R74.8 ELEVATED LIVER ENZYMES: ICD-10-CM

## 2024-06-10 PROCEDURE — 76705 ECHO EXAM OF ABDOMEN: CPT

## 2024-06-10 PROCEDURE — 76705 ECHO EXAM OF ABDOMEN: CPT | Performed by: STUDENT IN AN ORGANIZED HEALTH CARE EDUCATION/TRAINING PROGRAM

## 2024-06-12 ENCOUNTER — TELEPHONE (OUTPATIENT)
Dept: PRIMARY CARE | Facility: CLINIC | Age: 29
End: 2024-06-12
Payer: COMMERCIAL

## 2024-06-12 DIAGNOSIS — R74.8 ELEVATED LIVER ENZYMES: ICD-10-CM

## 2024-06-12 DIAGNOSIS — R53.83 FATIGUE, UNSPECIFIED TYPE: ICD-10-CM

## 2024-06-12 NOTE — TELEPHONE ENCOUNTER
----- Message from Luann Granger, TEO-CNS sent at 6/11/2024  9:42 PM EDT -----  Please let patient know that imaging shows mild fatty liver, will discuss further at follow up appointment. Please have her repeat CMP and Vitamin B12 prior to follow up appointment. Please order lab work. Thank you!

## 2024-08-15 ENCOUNTER — APPOINTMENT (OUTPATIENT)
Dept: PRIMARY CARE | Facility: CLINIC | Age: 29
End: 2024-08-15
Payer: COMMERCIAL

## 2024-08-15 ENCOUNTER — LAB (OUTPATIENT)
Dept: LAB | Facility: LAB | Age: 29
End: 2024-08-15
Payer: COMMERCIAL

## 2024-08-15 VITALS
BODY MASS INDEX: 42.48 KG/M2 | HEART RATE: 64 BPM | SYSTOLIC BLOOD PRESSURE: 104 MMHG | HEIGHT: 61 IN | DIASTOLIC BLOOD PRESSURE: 70 MMHG | WEIGHT: 225 LBS

## 2024-08-15 DIAGNOSIS — F32.A ANXIETY AND DEPRESSION: ICD-10-CM

## 2024-08-15 DIAGNOSIS — F31.9 BIPOLAR 1 DISORDER (MULTI): ICD-10-CM

## 2024-08-15 DIAGNOSIS — R53.83 FATIGUE, UNSPECIFIED TYPE: ICD-10-CM

## 2024-08-15 DIAGNOSIS — E66.01 MORBID OBESITY (MULTI): ICD-10-CM

## 2024-08-15 DIAGNOSIS — F41.9 ANXIETY AND DEPRESSION: ICD-10-CM

## 2024-08-15 DIAGNOSIS — R74.8 ELEVATED LIVER ENZYMES: ICD-10-CM

## 2024-08-15 DIAGNOSIS — E55.9 VITAMIN D DEFICIENCY: ICD-10-CM

## 2024-08-15 DIAGNOSIS — K21.9 GASTROESOPHAGEAL REFLUX DISEASE, UNSPECIFIED WHETHER ESOPHAGITIS PRESENT: ICD-10-CM

## 2024-08-15 LAB
ALBUMIN SERPL BCP-MCNC: 4.4 G/DL (ref 3.4–5)
ALP SERPL-CCNC: 45 U/L (ref 33–110)
ALT SERPL W P-5'-P-CCNC: 127 U/L (ref 7–45)
ANION GAP SERPL CALC-SCNC: 13 MMOL/L (ref 10–20)
AST SERPL W P-5'-P-CCNC: 55 U/L (ref 9–39)
BILIRUB SERPL-MCNC: 0.4 MG/DL (ref 0–1.2)
BUN SERPL-MCNC: 15 MG/DL (ref 6–23)
CALCIUM SERPL-MCNC: 9.4 MG/DL (ref 8.6–10.3)
CHLORIDE SERPL-SCNC: 104 MMOL/L (ref 98–107)
CHOLEST SERPL-MCNC: 182 MG/DL (ref 0–199)
CHOLESTEROL/HDL RATIO: 5.4
CO2 SERPL-SCNC: 24 MMOL/L (ref 21–32)
CREAT SERPL-MCNC: 0.89 MG/DL (ref 0.5–1.05)
EGFRCR SERPLBLD CKD-EPI 2021: >90 ML/MIN/1.73M*2
GLUCOSE SERPL-MCNC: 76 MG/DL (ref 74–99)
HDLC SERPL-MCNC: 33.7 MG/DL
LDLC SERPL CALC-MCNC: 121 MG/DL
NON HDL CHOLESTEROL: 148 MG/DL (ref 0–149)
POTASSIUM SERPL-SCNC: 4.7 MMOL/L (ref 3.5–5.3)
PROT SERPL-MCNC: 7.3 G/DL (ref 6.4–8.2)
SODIUM SERPL-SCNC: 136 MMOL/L (ref 136–145)
TRIGL SERPL-MCNC: 135 MG/DL (ref 0–149)
VIT B12 SERPL-MCNC: 958 PG/ML (ref 211–911)
VLDL: 27 MG/DL (ref 0–40)

## 2024-08-15 PROCEDURE — 80053 COMPREHEN METABOLIC PANEL: CPT

## 2024-08-15 PROCEDURE — 82607 VITAMIN B-12: CPT

## 2024-08-15 PROCEDURE — 99214 OFFICE O/P EST MOD 30 MIN: CPT | Performed by: CLINICAL NURSE SPECIALIST

## 2024-08-15 PROCEDURE — 1036F TOBACCO NON-USER: CPT | Performed by: CLINICAL NURSE SPECIALIST

## 2024-08-15 PROCEDURE — 3008F BODY MASS INDEX DOCD: CPT | Performed by: CLINICAL NURSE SPECIALIST

## 2024-08-15 PROCEDURE — 36415 COLL VENOUS BLD VENIPUNCTURE: CPT

## 2024-08-15 PROCEDURE — 80061 LIPID PANEL: CPT

## 2024-08-15 RX ORDER — OMEPRAZOLE 40 MG/1
40 CAPSULE, DELAYED RELEASE ORAL EVERY MORNING
Qty: 90 CAPSULE | Refills: 3 | Status: SHIPPED | OUTPATIENT
Start: 2024-08-15 | End: 2025-08-10

## 2024-08-15 ASSESSMENT — ENCOUNTER SYMPTOMS
FLANK PAIN: 0
SEIZURES: 0
DYSURIA: 0
NECK PAIN: 0
TROUBLE SWALLOWING: 0
WHEEZING: 0
SORE THROAT: 0
DEPRESSION: 1
CHILLS: 0
FEVER: 0
BLOOD IN STOOL: 0
CHEST TIGHTNESS: 0
OCCASIONAL FEELINGS OF UNSTEADINESS: 0
ARTHRALGIAS: 0
LOSS OF SENSATION IN FEET: 0
SHORTNESS OF BREATH: 0
CONFUSION: 0
APPETITE CHANGE: 0
EYE PAIN: 0
BRUISES/BLEEDS EASILY: 0
HEADACHES: 0
PALPITATIONS: 0
POLYDIPSIA: 0
ABDOMINAL PAIN: 0
UNEXPECTED WEIGHT CHANGE: 0
VOMITING: 0
JOINT SWELLING: 0
CONSTIPATION: 0
SLEEP DISTURBANCE: 0
COUGH: 0
ACTIVITY CHANGE: 0
HEMATURIA: 0
MYALGIAS: 0
PHOTOPHOBIA: 0
FATIGUE: 0
BACK PAIN: 0
NAUSEA: 0
DIZZINESS: 0
DIARRHEA: 0
WOUND: 0

## 2024-08-15 ASSESSMENT — ANXIETY QUESTIONNAIRES
7. FEELING AFRAID AS IF SOMETHING AWFUL MIGHT HAPPEN: NOT AT ALL
4. TROUBLE RELAXING: NOT AT ALL
2. NOT BEING ABLE TO STOP OR CONTROL WORRYING: SEVERAL DAYS
6. BECOMING EASILY ANNOYED OR IRRITABLE: SEVERAL DAYS
5. BEING SO RESTLESS THAT IT IS HARD TO SIT STILL: SEVERAL DAYS
GAD7 TOTAL SCORE: 4
3. WORRYING TOO MUCH ABOUT DIFFERENT THINGS: SEVERAL DAYS
1. FEELING NERVOUS, ANXIOUS, OR ON EDGE: NOT AT ALL

## 2024-08-15 ASSESSMENT — PATIENT HEALTH QUESTIONNAIRE - PHQ9
8. MOVING OR SPEAKING SO SLOWLY THAT OTHER PEOPLE COULD HAVE NOTICED. OR THE OPPOSITE, BEING SO FIGETY OR RESTLESS THAT YOU HAVE BEEN MOVING AROUND A LOT MORE THAN USUAL: NOT AT ALL
5. POOR APPETITE OR OVEREATING: SEVERAL DAYS
7. TROUBLE CONCENTRATING ON THINGS, SUCH AS READING THE NEWSPAPER OR WATCHING TELEVISION: NOT AT ALL
SUM OF ALL RESPONSES TO PHQ9 QUESTIONS 1 AND 2: 4
4. FEELING TIRED OR HAVING LITTLE ENERGY: MORE THAN HALF THE DAYS
SUM OF ALL RESPONSES TO PHQ QUESTIONS 1-9: 10
6. FEELING BAD ABOUT YOURSELF - OR THAT YOU ARE A FAILURE OR HAVE LET YOURSELF OR YOUR FAMILY DOWN: NOT AT ALL
3. TROUBLE FALLING OR STAYING ASLEEP OR SLEEPING TOO MUCH: NEARLY EVERY DAY
2. FEELING DOWN, DEPRESSED OR HOPELESS: NEARLY EVERY DAY
9. THOUGHTS THAT YOU WOULD BE BETTER OFF DEAD, OR OF HURTING YOURSELF: NOT AT ALL
1. LITTLE INTEREST OR PLEASURE IN DOING THINGS: SEVERAL DAYS

## 2024-08-15 ASSESSMENT — COLUMBIA-SUICIDE SEVERITY RATING SCALE - C-SSRS
1. IN THE PAST MONTH, HAVE YOU WISHED YOU WERE DEAD OR WISHED YOU COULD GO TO SLEEP AND NOT WAKE UP?: NO
6. HAVE YOU EVER DONE ANYTHING, STARTED TO DO ANYTHING, OR PREPARED TO DO ANYTHING TO END YOUR LIFE?: NO
2. HAVE YOU ACTUALLY HAD ANY THOUGHTS OF KILLING YOURSELF?: NO

## 2024-08-15 NOTE — PROGRESS NOTES
"Subjective   Patient ID: Saskia Winn is a 28 y.o. female who presents for Follow-up (Follow up).  HPI    Here today as a follow up appointment.      Recurrent GI episodes. Uses Zofran PRN. Increased abdominal pain, N/V/D episodes. 24H, happening monthly. Omeprazole. Improved. Worsened again in January 2024.     Dietary changes. Intentional.       Fluoxetine from previous PCP. Anxiety/Depression/Bipolar. Previously diagnosed from Bipolar from NP in Psych. Counselor since she was 17. Concerned that she wants to \"dive deeper\" in her Mental Health. Unsure if there is another Diagnosis. Interested in having Counseling done here. Struggling with her Career and working through Changes.     Review of Systems   Constitutional:  Negative for activity change, appetite change, chills, fatigue, fever and unexpected weight change.   HENT:  Negative for ear pain, hearing loss, nosebleeds, sore throat, tinnitus and trouble swallowing.    Eyes:  Negative for photophobia, pain and visual disturbance.   Respiratory:  Negative for cough, chest tightness, shortness of breath and wheezing.    Cardiovascular:  Negative for chest pain, palpitations and leg swelling.   Gastrointestinal:  Negative for abdominal pain, blood in stool, constipation, diarrhea, nausea and vomiting.   Endocrine: Negative for cold intolerance, heat intolerance, polydipsia and polyuria.   Genitourinary:  Negative for dysuria, flank pain and hematuria.   Musculoskeletal:  Negative for arthralgias, back pain, joint swelling, myalgias and neck pain.   Skin:  Negative for pallor, rash and wound.   Allergic/Immunologic: Negative for immunocompromised state.   Neurological:  Negative for dizziness, seizures and headaches.   Hematological:  Does not bruise/bleed easily.   Psychiatric/Behavioral:  Negative for confusion and sleep disturbance.        Objective   Physical Exam  Vitals and nursing note reviewed.   Constitutional:       General: She is not in acute distress.   "   Appearance: Normal appearance.   HENT:      Head: Normocephalic.      Nose: Nose normal.   Eyes:      Conjunctiva/sclera: Conjunctivae normal.   Neck:      Vascular: No carotid bruit.   Cardiovascular:      Rate and Rhythm: Normal rate and regular rhythm.      Pulses: Normal pulses.      Heart sounds: Normal heart sounds.   Pulmonary:      Effort: Pulmonary effort is normal.      Breath sounds: Normal breath sounds.   Abdominal:      General: Bowel sounds are normal.      Palpations: Abdomen is soft.   Musculoskeletal:         General: Normal range of motion.      Cervical back: Normal range of motion.   Skin:     General: Skin is warm and dry.   Neurological:      Mental Status: She is alert and oriented to person, place, and time. Mental status is at baseline.   Psychiatric:         Mood and Affect: Mood normal.         Behavior: Behavior normal.       Assessment/Plan       Updated lab work ordered.      Bipolar/Anxiety/Depression: PHQ9 and GAD7 completed. [facility] referral for Diagnostic recommendations. Did not follow through with referral. Interested in Counseling. Behavioral Health referral.  Continue medication at current dosage until appointment.  Obesity: BMI: 42.51. Lifestyle changes recommended: Diet consisting of low fat foods, lean meats, high fiber, fresh fruits and vegetables. 150 min/ weekly aerobic exercise.  Nutrition referral.  Vitamin D Deficiency: Vitamin D. Updated lab work ordered.   Elevated Liver Enzymes: Labs ordered. Will follow up pending results. US completed.   GERD: Continue Omeprazole and following with GI.      Wellness: January 2024.    Flu Vaccine: October 2023.   Tdap: January 2024.   Colonoscopy/EGD: May 2020.   COVID Vaccine: May 2021.     TEO Velasquez-CNS 08/15/24 10:53 AM

## 2024-08-20 ENCOUNTER — APPOINTMENT (OUTPATIENT)
Dept: NUTRITION | Facility: CLINIC | Age: 29
End: 2024-08-20
Payer: COMMERCIAL

## 2024-08-20 ENCOUNTER — TELEPHONE (OUTPATIENT)
Dept: OBSTETRICS AND GYNECOLOGY | Facility: CLINIC | Age: 29
End: 2024-08-20

## 2024-08-20 DIAGNOSIS — Z71.3 DIETARY COUNSELING AND SURVEILLANCE: ICD-10-CM

## 2024-08-20 DIAGNOSIS — E66.01 MORBID OBESITY (MULTI): ICD-10-CM

## 2024-08-20 DIAGNOSIS — Z76.89 ENCOUNTER FOR WEIGHT MANAGEMENT: ICD-10-CM

## 2024-08-20 DIAGNOSIS — K29.70 GASTRITIS, PRESENCE OF BLEEDING UNSPECIFIED, UNSPECIFIED CHRONICITY, UNSPECIFIED GASTRITIS TYPE: Primary | ICD-10-CM

## 2024-08-20 PROCEDURE — 97802 MEDICAL NUTRITION INDIV IN: CPT

## 2024-08-20 NOTE — PROGRESS NOTES
"NUTRITION ASSESSMENT NOTE    Reason for Nutrition Visit:  Pt is a 28 y.o. female being seen in person for an initial appointment at [facility] referred for morbid obesity.        Anthropometrics:  Wt: 225#, 102kg  Ht: 5'0\"  BMI: 42.51 kg/m2      Past Medical Hx:  Patient Active Problem List   Diagnosis    Excessive weight gain    Gastritis    Depression    Anxiety    Bipolar 1 disorder (Multi)    Anxiety and depression    Chronic sinusitis    Gastroesophageal reflux disease    Deviated septum    Chronic allergic rhinitis    Irritable bowel syndrome    Elevated liver enzymes          Lab Results   Component Value Date    HGBA1C 5.4 05/13/2024    CHOL 182 08/15/2024    LDLF 77 07/24/2023    TRIG 135 08/15/2024          Food and Nutrition Hx:  Pt states they seek  from dietitian for help managing GI health/stress-related gastritis as well as wt management with emphasis on loss - pt worked with a dietitian when she was 9 years old, and pt found the session highly traumatizing, thus pt has not worked with a nutrition professional since. Pt states that she has been working on making intentional and better choices with diet pattern, as well as trying to conduct more cooking at home. Pt does state that she is \"terrified of meal prep\" d/t getting tired of the redundancy of eating the same items over and over again. To further complicate matters, pt states that her kitchen is small and ill-equipped, thereby limiting her ability to prepare foods/meals.     Physical Activity: Nothing dedicated currently, physically active with work a great deal of the time around [employer].     Stress: High, pt was referred to counseling/therapy.     RDN provided full array of medical nutrition therapy (MNT) recommendations with specific governance over wt management, GI health, and balanced eating.     DIETARY RECALL: *Pt consumes an average of 2 meals/day*  Wake-up: ~7:30am-8:30am  Meal 1: Does not consume this meal  Meal 2: " "Everyday, ~12pm/12:30pm, Salads w/ grilled chicken/carrots/tomatoes/cucumbers/croutons/cheese, [employer] \"[employer]\" offerings to include fried items, fruit  Meal 3: Everyday, ~6pm/7pm, Fast food, turkey sloppy Napoleon's, pierogies w/ onions, crescent rolls w/ pulled chicken casserole  Snacks: Evenings, not typically consumed, Granny Smith apples, xin crackers, pretzels  Bedtime: ~11pm-12am  Beverages: Water x ~48-64oz+ daily, alcohol (occasional), coffee  x 3 C/week, smoothies (fruit, Greek yogurt, almond milk)      NUTRITIONAL ARTIFACTS:  \"Default\" recipes are pierogies w/ onions and crescent rolls and pulled chicken casserole -- recently procured a quality crock-pot -- likes smoothies -- admits to having a sweet tooth -- likes fish and seafood    Allergies: None  Intolerance: Tomato sauce, pepperoni, orange juice  Appetite: Normal  Intake: >75%  GI Symptoms : nausea, diarrhea, and reflux Frequency: occasional    Food Preparation: Patient  Cooking Skills/Barriers: Time constraints and Inadequate kitchen appliances or supplies       Nutrition Focused Physical Exam:    Performed/Deferred: Deferred as pt visually appears well-nourished with no signs of malnutrition    Estimated Energy Needs:    *Pt BMI @ 42.51 kg/m2*  WEIGHT LOSS: 25-30 kcal/kg IBW = 9835-2227 kcal/day  PROTEIN Needs: 0.8-1 g/kg =  g/day    Nutrition Diagnosis:    Diagnosis Statement 1:  Diagnosis Status: New  Diagnosis : Altered nutrition related lab values  related to  endocrine and/or metabolic dysfunction  as evidenced by  pt LDL of 121 mg/dL    Diagnosis Statement 2:  Diagnosis Status: New  Diagnosis : Inadequate fiber intake  related to food and nutrition related knowledge deficit concerning desirable quantities of fiber as evidenced by estimated intake of fiber that is insufficient when compared to recommended amounts (38 g/day for men and 25 g/day for women)    Diagnosis Statement 3:   Diagnosis Status: New  Diagnosis : Inadequate " protein intake  related to food and nutrition related knowledge deficit concerning appropriate amount and type of dietary fat and/or protein as evidenced by  dietary recall reflecting daily intake of protein at levles <75% daily recommended amounts    Nutrition Interventions:  Anti-Inflammatory Diet, Increased Fiber Diet, Increased Soluble Fiber, Increased Omega-3 Diet, Increased Protein Diet, Mediterranean Diet, and Plant Based Diet  Nutrition Counseling: Motivational Interviewing, Goal Setting, and Health Belief Model  Coordination of Care: None      Educational Handouts: ADA My Plate, Protein Content of Foods List, 30DMDMP, ACLM FAMJS, ACLM NB's, [place]-3 MNT,  Medi Diet, [place] Medi Diet

## 2024-08-29 DIAGNOSIS — F32.A ANXIETY AND DEPRESSION: ICD-10-CM

## 2024-08-29 DIAGNOSIS — F41.9 ANXIETY AND DEPRESSION: ICD-10-CM

## 2024-09-04 ENCOUNTER — PATIENT MESSAGE (OUTPATIENT)
Dept: PRIMARY CARE | Facility: CLINIC | Age: 29
End: 2024-09-04
Payer: COMMERCIAL

## 2024-09-04 DIAGNOSIS — F41.9 ANXIETY AND DEPRESSION: ICD-10-CM

## 2024-09-04 DIAGNOSIS — F32.A ANXIETY AND DEPRESSION: ICD-10-CM

## 2024-09-04 RX ORDER — FLUOXETINE 10 MG/1
10 CAPSULE ORAL DAILY
Qty: 90 CAPSULE | Refills: 0 | OUTPATIENT
Start: 2024-09-04

## 2024-09-04 RX ORDER — FLUOXETINE HYDROCHLORIDE 40 MG/1
40 CAPSULE ORAL DAILY
Qty: 90 CAPSULE | Refills: 0 | Status: SHIPPED | OUTPATIENT
Start: 2024-09-04 | End: 2024-12-03

## 2024-09-04 RX ORDER — FLUOXETINE HYDROCHLORIDE 40 MG/1
40 CAPSULE ORAL DAILY
Qty: 90 CAPSULE | Refills: 0 | OUTPATIENT
Start: 2024-09-04

## 2024-09-27 ASSESSMENT — PROMIS GLOBAL HEALTH SCALE
EMOTIONAL_PROBLEMS: SOMETIMES
CARRYOUT_PHYSICAL_ACTIVITIES: MOSTLY
RATE_GENERAL_HEALTH: FAIR
RATE_AVERAGE_PAIN: 0
RATE_PHYSICAL_HEALTH: FAIR
RATE_SOCIAL_SATISFACTION: GOOD
CARRYOUT_SOCIAL_ACTIVITIES: GOOD
RATE_QUALITY_OF_LIFE: POOR
RATE_MENTAL_HEALTH: GOOD
RATE_AVERAGE_FATIGUE: MODERATE

## 2024-10-01 ENCOUNTER — APPOINTMENT (OUTPATIENT)
Dept: PRIMARY CARE | Facility: CLINIC | Age: 29
End: 2024-10-01
Payer: COMMERCIAL

## 2024-10-01 VITALS
HEART RATE: 98 BPM | DIASTOLIC BLOOD PRESSURE: 80 MMHG | BODY MASS INDEX: 41.38 KG/M2 | WEIGHT: 219 LBS | SYSTOLIC BLOOD PRESSURE: 116 MMHG | OXYGEN SATURATION: 98 %

## 2024-10-01 DIAGNOSIS — F32.A ANXIETY AND DEPRESSION: Primary | ICD-10-CM

## 2024-10-01 DIAGNOSIS — Z23 ENCOUNTER FOR IMMUNIZATION: ICD-10-CM

## 2024-10-01 DIAGNOSIS — F41.9 ANXIETY AND DEPRESSION: Primary | ICD-10-CM

## 2024-10-01 PROCEDURE — 90656 IIV3 VACC NO PRSV 0.5 ML IM: CPT | Performed by: STUDENT IN AN ORGANIZED HEALTH CARE EDUCATION/TRAINING PROGRAM

## 2024-10-01 PROCEDURE — 90471 IMMUNIZATION ADMIN: CPT | Performed by: STUDENT IN AN ORGANIZED HEALTH CARE EDUCATION/TRAINING PROGRAM

## 2024-10-01 PROCEDURE — 99213 OFFICE O/P EST LOW 20 MIN: CPT | Performed by: STUDENT IN AN ORGANIZED HEALTH CARE EDUCATION/TRAINING PROGRAM

## 2024-10-01 PROCEDURE — 1036F TOBACCO NON-USER: CPT | Performed by: STUDENT IN AN ORGANIZED HEALTH CARE EDUCATION/TRAINING PROGRAM

## 2024-10-01 RX ORDER — FLUOXETINE 10 MG/1
10 CAPSULE ORAL DAILY
Qty: 90 CAPSULE | Refills: 1 | Status: SHIPPED | OUTPATIENT
Start: 2024-10-01 | End: 2025-03-30

## 2024-10-01 RX ORDER — FLUOXETINE HYDROCHLORIDE 40 MG/1
40 CAPSULE ORAL DAILY
Qty: 90 CAPSULE | Refills: 1 | Status: SHIPPED | OUTPATIENT
Start: 2024-10-01 | End: 2025-03-30

## 2024-10-01 ASSESSMENT — PATIENT HEALTH QUESTIONNAIRE - PHQ9
2. FEELING DOWN, DEPRESSED OR HOPELESS: NOT AT ALL
1. LITTLE INTEREST OR PLEASURE IN DOING THINGS: NOT AT ALL
SUM OF ALL RESPONSES TO PHQ9 QUESTIONS 1 AND 2: 0

## 2024-10-01 NOTE — PROGRESS NOTES
Saskia Winn is a 28 y.o. year old female presenting for Anxiety       HPI:  1. Anxiety  Patient has a long history of Anxiety and bipolar II disorder  Her symptoms are depressive with depressive mood, increased appetite and sleep with decreased motivation and anhedonia.  Per her last visit with us in January 2024, she stated she takes 50 mg of Fluoxetine daily  A review of her chart indicates that she is prescribed Fluoxetine from multiple providers: 40 mg from our clinic and 10 mg from Luann Perez  Feels well on the current medication and denies any SI/HI    Was provided with a referral to a behavioral health provider in August 2024 by her other PCP but Saskia was unable to be contacted  States she recently lost her job and will no longer have insurance after this month  Requesting refills be sent for 6 months during this transition period      ROS:   All pertinent positive symptoms are included in history of present illness.    All other systems have been reviewed and are negative and noncontributory to this patient's current ailments.    Current Outpatient Medications   Medication Sig Dispense Refill    cholecalciferol (Vitamin D-3) 5,000 Units tablet Take by mouth.      FLUoxetine (PROzac) 10 mg capsule Take 1 capsule (10 mg) by mouth once daily. 90 capsule 1    FLUoxetine (PROzac) 40 mg capsule Take 1 capsule (40 mg) by mouth once daily. 90 capsule 1    levonorgestrel (Mirena) 21 mcg/24 hours (8 yrs) 52 mg IUD 52 mg by intrauterine route 1 time.      omeprazole (PriLOSEC) 40 mg DR capsule Take 1 capsule (40 mg) by mouth once daily in the morning. 90 capsule 3    ondansetron (Zofran) 4 mg tablet TAKE 1 TABLET BY MOUTH EVERY 8 HOURS AS NEEDED FOR NAUSEA OR VOMITING FOR UP TO 7 DAYS       No current facility-administered medications for this visit.       OBJECTIVE  Visit Vitals  /80 (BP Location: Left arm, Patient Position: Sitting, BP Cuff Size: Large adult)   Pulse 98   Wt 99.3 kg (219 lb)   SpO2 98%    BMI 41.38 kg/m²   OB Status Implant   Smoking Status Never   BSA 2.07 m²        Physical Exam:  GENERAL: Alert, oriented, pleasant, in no acute distress  HEENT: Head normocephalic, atraumatic  CV: Heart with regular rate and rhythm, normal S1/S2, no murmurs; normal peripheral pulses- radial and dorsalis pedis palpable  LUNGS: CTAB without wheezing, rhonchi or rales; good respiratory effort, no increased work of breathing  EXTREMITIES: no edema, no cyanosis  PSYCH: Appropriate mood and affect  SKIN: No rashes or lesions appreciated    Assessment/Plan   Diagnoses and all orders for this visit:  Anxiety and depression  Stable, no changes needed  Refills for 6 months have been sent to your pharmacy  Per our discussion, Luann Shepard is a primary care provider and not a speciality provider  If you do decide to stay in the area and would like to maintain your status as a patient, please choose one provider as your primary caregiver  -     FLUoxetine (PROzac) 10 mg capsule; Take 1 capsule (10 mg) by mouth once daily.  -     FLUoxetine (PROzac) 40 mg capsule; Take 1 capsule (40 mg) by mouth once daily.  Encounter for immunization  Flu shot given during today's visit  -     Flu vaccine, trivalent, preservative free, age 6 months and greater (Fluarix/Fluzone/Flulaval)

## 2024-12-16 ENCOUNTER — APPOINTMENT (OUTPATIENT)
Dept: PRIMARY CARE | Facility: CLINIC | Age: 29
End: 2024-12-16
Payer: COMMERCIAL

## 2025-07-14 DIAGNOSIS — F32.A ANXIETY AND DEPRESSION: ICD-10-CM

## 2025-07-14 DIAGNOSIS — F41.9 ANXIETY AND DEPRESSION: ICD-10-CM

## 2025-07-15 RX ORDER — FLUOXETINE 10 MG/1
10 CAPSULE ORAL DAILY
Qty: 90 CAPSULE | Refills: 0 | OUTPATIENT
Start: 2025-07-15

## 2025-07-15 RX ORDER — FLUOXETINE HYDROCHLORIDE 40 MG/1
40 CAPSULE ORAL DAILY
Qty: 90 CAPSULE | Refills: 0 | OUTPATIENT
Start: 2025-07-15

## 2025-07-15 NOTE — TELEPHONE ENCOUNTER
PRADEEP. Pt was called regarding her request for fluoxetine refill. Pt was notified that her request was denied and needs an apt before a refill can be sent in. Pt was notified to call back to schedule an apt